# Patient Record
Sex: FEMALE | Race: OTHER | HISPANIC OR LATINO
[De-identification: names, ages, dates, MRNs, and addresses within clinical notes are randomized per-mention and may not be internally consistent; named-entity substitution may affect disease eponyms.]

---

## 2017-03-10 ENCOUNTER — APPOINTMENT (OUTPATIENT)
Dept: MRI IMAGING | Facility: CLINIC | Age: 57
End: 2017-03-10

## 2017-03-10 ENCOUNTER — APPOINTMENT (OUTPATIENT)
Dept: RADIOLOGY | Facility: CLINIC | Age: 57
End: 2017-03-10

## 2017-03-10 ENCOUNTER — OUTPATIENT (OUTPATIENT)
Dept: OUTPATIENT SERVICES | Facility: HOSPITAL | Age: 57
LOS: 1 days | End: 2017-03-10
Payer: COMMERCIAL

## 2017-03-10 DIAGNOSIS — Z00.8 ENCOUNTER FOR OTHER GENERAL EXAMINATION: ICD-10-CM

## 2017-03-10 PROCEDURE — A9585: CPT

## 2017-03-10 PROCEDURE — C8908: CPT

## 2017-03-10 PROCEDURE — 77080 DXA BONE DENSITY AXIAL: CPT

## 2017-03-10 PROCEDURE — 77080 DXA BONE DENSITY AXIAL: CPT | Mod: 26

## 2017-03-10 PROCEDURE — C8937: CPT

## 2017-03-10 PROCEDURE — 77059 MRI BREAST BILATERAL: CPT | Mod: 26

## 2017-03-10 PROCEDURE — 0159T: CPT | Mod: 26

## 2017-05-31 ENCOUNTER — APPOINTMENT (OUTPATIENT)
Dept: SURGERY | Facility: CLINIC | Age: 57
End: 2017-05-31

## 2017-06-28 ENCOUNTER — APPOINTMENT (OUTPATIENT)
Dept: SURGERY | Facility: CLINIC | Age: 57
End: 2017-06-28

## 2017-09-05 ENCOUNTER — OUTPATIENT (OUTPATIENT)
Dept: OUTPATIENT SERVICES | Facility: HOSPITAL | Age: 57
LOS: 1 days | End: 2017-09-05
Payer: COMMERCIAL

## 2017-09-05 ENCOUNTER — APPOINTMENT (OUTPATIENT)
Dept: MAMMOGRAPHY | Facility: CLINIC | Age: 57
End: 2017-09-05

## 2017-09-05 ENCOUNTER — APPOINTMENT (OUTPATIENT)
Dept: ULTRASOUND IMAGING | Facility: CLINIC | Age: 57
End: 2017-09-05

## 2017-09-05 DIAGNOSIS — Z00.8 ENCOUNTER FOR OTHER GENERAL EXAMINATION: ICD-10-CM

## 2017-09-05 PROCEDURE — 77063 BREAST TOMOSYNTHESIS BI: CPT | Mod: 26

## 2017-09-05 PROCEDURE — 76641 ULTRASOUND BREAST COMPLETE: CPT

## 2017-09-05 PROCEDURE — G0202: CPT | Mod: 26

## 2017-09-05 PROCEDURE — 76641 ULTRASOUND BREAST COMPLETE: CPT | Mod: 26,50

## 2017-09-05 PROCEDURE — 77067 SCR MAMMO BI INCL CAD: CPT

## 2017-09-05 PROCEDURE — 77063 BREAST TOMOSYNTHESIS BI: CPT

## 2017-12-13 ENCOUNTER — APPOINTMENT (OUTPATIENT)
Dept: RADIOLOGY | Facility: CLINIC | Age: 57
End: 2017-12-13

## 2017-12-13 ENCOUNTER — APPOINTMENT (OUTPATIENT)
Dept: ULTRASOUND IMAGING | Facility: CLINIC | Age: 57
End: 2017-12-13

## 2017-12-13 ENCOUNTER — OUTPATIENT (OUTPATIENT)
Dept: OUTPATIENT SERVICES | Facility: HOSPITAL | Age: 57
LOS: 1 days | End: 2017-12-13
Payer: COMMERCIAL

## 2017-12-13 DIAGNOSIS — Z00.8 ENCOUNTER FOR OTHER GENERAL EXAMINATION: ICD-10-CM

## 2017-12-13 PROCEDURE — 73110 X-RAY EXAM OF WRIST: CPT | Mod: 26,RT

## 2017-12-13 PROCEDURE — 76536 US EXAM OF HEAD AND NECK: CPT | Mod: 26

## 2017-12-13 PROCEDURE — 73110 X-RAY EXAM OF WRIST: CPT

## 2017-12-13 PROCEDURE — 76536 US EXAM OF HEAD AND NECK: CPT

## 2018-05-17 ENCOUNTER — APPOINTMENT (OUTPATIENT)
Dept: MRI IMAGING | Facility: CLINIC | Age: 58
End: 2018-05-17
Payer: COMMERCIAL

## 2018-05-17 ENCOUNTER — OUTPATIENT (OUTPATIENT)
Dept: OUTPATIENT SERVICES | Facility: HOSPITAL | Age: 58
LOS: 1 days | End: 2018-05-17
Payer: COMMERCIAL

## 2018-05-17 DIAGNOSIS — Z00.8 ENCOUNTER FOR OTHER GENERAL EXAMINATION: ICD-10-CM

## 2018-05-17 PROCEDURE — C8908: CPT

## 2018-05-17 PROCEDURE — 77059 MRI BREAST BILATERAL: CPT | Mod: 26

## 2018-05-17 PROCEDURE — A9585: CPT

## 2018-05-17 PROCEDURE — 0159T: CPT | Mod: 26

## 2018-05-17 PROCEDURE — C8937: CPT

## 2018-06-27 ENCOUNTER — APPOINTMENT (OUTPATIENT)
Dept: SURGERY | Facility: CLINIC | Age: 58
End: 2018-06-27
Payer: COMMERCIAL

## 2018-06-27 PROCEDURE — 99213K: CUSTOM

## 2018-07-19 ENCOUNTER — APPOINTMENT (OUTPATIENT)
Dept: RADIOLOGY | Facility: CLINIC | Age: 58
End: 2018-07-19
Payer: COMMERCIAL

## 2018-07-19 ENCOUNTER — OUTPATIENT (OUTPATIENT)
Dept: OUTPATIENT SERVICES | Facility: HOSPITAL | Age: 58
LOS: 1 days | End: 2018-07-19
Payer: COMMERCIAL

## 2018-07-19 DIAGNOSIS — Z00.8 ENCOUNTER FOR OTHER GENERAL EXAMINATION: ICD-10-CM

## 2018-07-19 PROCEDURE — 73502 X-RAY EXAM HIP UNI 2-3 VIEWS: CPT | Mod: 26,RT

## 2018-07-19 PROCEDURE — 73502 X-RAY EXAM HIP UNI 2-3 VIEWS: CPT

## 2018-07-27 ENCOUNTER — APPOINTMENT (OUTPATIENT)
Dept: MRI IMAGING | Facility: CLINIC | Age: 58
End: 2018-07-27
Payer: COMMERCIAL

## 2018-07-27 ENCOUNTER — OUTPATIENT (OUTPATIENT)
Dept: OUTPATIENT SERVICES | Facility: HOSPITAL | Age: 58
LOS: 1 days | End: 2018-07-27
Payer: COMMERCIAL

## 2018-07-27 DIAGNOSIS — Z00.8 ENCOUNTER FOR OTHER GENERAL EXAMINATION: ICD-10-CM

## 2018-07-27 PROCEDURE — 73721 MRI JNT OF LWR EXTRE W/O DYE: CPT

## 2018-07-27 PROCEDURE — 73721 MRI JNT OF LWR EXTRE W/O DYE: CPT | Mod: 26,RT

## 2019-08-28 ENCOUNTER — APPOINTMENT (OUTPATIENT)
Dept: SURGERY | Facility: CLINIC | Age: 59
End: 2019-08-28

## 2019-09-04 ENCOUNTER — FORM ENCOUNTER (OUTPATIENT)
Age: 59
End: 2019-09-04

## 2019-09-05 ENCOUNTER — APPOINTMENT (OUTPATIENT)
Dept: ULTRASOUND IMAGING | Facility: CLINIC | Age: 59
End: 2019-09-05
Payer: COMMERCIAL

## 2019-09-05 ENCOUNTER — FORM ENCOUNTER (OUTPATIENT)
Age: 59
End: 2019-09-05

## 2019-09-05 ENCOUNTER — APPOINTMENT (OUTPATIENT)
Dept: MAMMOGRAPHY | Facility: CLINIC | Age: 59
End: 2019-09-05
Payer: COMMERCIAL

## 2019-09-05 ENCOUNTER — OUTPATIENT (OUTPATIENT)
Dept: OUTPATIENT SERVICES | Facility: HOSPITAL | Age: 59
LOS: 1 days | End: 2019-09-05
Payer: COMMERCIAL

## 2019-09-05 DIAGNOSIS — Z00.8 ENCOUNTER FOR OTHER GENERAL EXAMINATION: ICD-10-CM

## 2019-09-05 PROCEDURE — 76641 ULTRASOUND BREAST COMPLETE: CPT | Mod: 26,50

## 2019-09-05 PROCEDURE — 77063 BREAST TOMOSYNTHESIS BI: CPT | Mod: 26

## 2019-09-05 PROCEDURE — 76641 ULTRASOUND BREAST COMPLETE: CPT

## 2019-09-05 PROCEDURE — 77063 BREAST TOMOSYNTHESIS BI: CPT

## 2019-09-05 PROCEDURE — 77067 SCR MAMMO BI INCL CAD: CPT

## 2019-09-05 PROCEDURE — 77067 SCR MAMMO BI INCL CAD: CPT | Mod: 26

## 2019-09-13 ENCOUNTER — NON-APPOINTMENT (OUTPATIENT)
Age: 59
End: 2019-09-13

## 2019-09-13 ENCOUNTER — LABORATORY RESULT (OUTPATIENT)
Age: 59
End: 2019-09-13

## 2019-09-13 ENCOUNTER — APPOINTMENT (OUTPATIENT)
Dept: INTERNAL MEDICINE | Facility: CLINIC | Age: 59
End: 2019-09-13
Payer: COMMERCIAL

## 2019-09-13 VITALS
OXYGEN SATURATION: 97 % | HEIGHT: 66 IN | TEMPERATURE: 98.6 F | WEIGHT: 195.44 LBS | BODY MASS INDEX: 31.41 KG/M2 | DIASTOLIC BLOOD PRESSURE: 68 MMHG | HEART RATE: 73 BPM | SYSTOLIC BLOOD PRESSURE: 102 MMHG | RESPIRATION RATE: 16 BRPM

## 2019-09-13 DIAGNOSIS — Z78.9 OTHER SPECIFIED HEALTH STATUS: ICD-10-CM

## 2019-09-13 DIAGNOSIS — J30.2 OTHER SEASONAL ALLERGIC RHINITIS: ICD-10-CM

## 2019-09-13 DIAGNOSIS — Z82.49 FAMILY HISTORY OF ISCHEMIC HEART DISEASE AND OTHER DISEASES OF THE CIRCULATORY SYSTEM: ICD-10-CM

## 2019-09-13 DIAGNOSIS — Z87.42 PERSONAL HISTORY OF OTHER DISEASES OF THE FEMALE GENITAL TRACT: ICD-10-CM

## 2019-09-13 DIAGNOSIS — Z83.3 FAMILY HISTORY OF DIABETES MELLITUS: ICD-10-CM

## 2019-09-13 PROCEDURE — 36415 COLL VENOUS BLD VENIPUNCTURE: CPT

## 2019-09-13 PROCEDURE — 93000 ELECTROCARDIOGRAM COMPLETE: CPT

## 2019-09-13 PROCEDURE — 99396 PREV VISIT EST AGE 40-64: CPT | Mod: 25

## 2019-09-15 PROBLEM — Z82.49 FAMILY HISTORY OF CORONARY ARTERY DISEASE: Status: ACTIVE | Noted: 2019-09-13

## 2019-09-15 PROBLEM — Z83.3 FAMILY HISTORY OF DIABETES MELLITUS: Status: ACTIVE | Noted: 2019-09-13

## 2019-09-15 PROBLEM — Z87.42 HISTORY OF ENDOMETRIOSIS: Status: RESOLVED | Noted: 2019-09-13 | Resolved: 2019-09-15

## 2019-09-15 PROBLEM — Z82.49 FH: CABG (CORONARY ARTERY BYPASS SURGERY): Status: ACTIVE | Noted: 2019-09-13

## 2019-09-15 NOTE — HISTORY OF PRESENT ILLNESS
[FreeTextEntry1] : annual physical exam\par  [de-identified] : Patient is a 59 year old female with a past medical history as below who presents for annual physical exam. Patient is not currently taking any prescription medications, but is taking several vitamins/supplements (Biotin, Calcium, vitamin B12, B complex, Ocuvite, Turmeric, vitamin C, and vitamin D3 82085). Patient notes intermittent difficulty fully exhaling likely secondary to asthma. She states asthmatic symptoms are usually exacerbated during allergy season and notes taking Zyrtec as needed. Patient states she saw her gynecologist, Dr. Wallis last week. Mammogram done last week was normal. She states she will be having a uterine biopsy done. Patient has never had a colonoscopy. She denies any issues with vision, but notes issues with hearing and itchy ears. She notes intermittent hoarseness/losing her voice after speaking for long periods of time. Patient states she tries to maintain a well-balanced diet, but has not been exercising regularly. Patient received the flu vaccine recently. She is unsure if she has received a tetanus shot in the past 10 years. She notes recent PPD testing.

## 2019-09-15 NOTE — PLAN
[FreeTextEntry1] : Pulmonary\par asthma - discussed starting medication/inhaler, refused as patient notes symptoms are well-controlled \par ENT\par hoarseness/losing voice - referred to ENT specialist, Dr. Gill for possible laryngoscopy  \par seasonal allergies - continue Zyrtec p.o. p.r.n. \par Endocrinology\par hyperlipidemia - continue low cholesterol/low fat diet - check FLP \par obesity - advised low carbohydrate diet and increased CV exercise\par vitamin D deficiency - continue vitamin D-3 26575 unit tablets p.o.q.w. - check vitamin D\par Gastroenterology\par screening colonoscopy - patient to follow up with gastroenterologist, Dr. Duncan as she is due \par Infectious Disease\par Tdap (Adacel) Vaccine - discussed, patient to determine if she has received the vaccine and follow up \par \par check EKG, female panel, UA

## 2019-09-15 NOTE — REVIEW OF SYSTEMS
[Negative] : Heme/Lymph [Hearing Loss] : hearing loss [FreeTextEntry4] : itchy ears; intermittent hoarseness/losing voice after speaking

## 2019-09-15 NOTE — PHYSICAL EXAM
[No Acute Distress] : no acute distress [Well Nourished] : well nourished [Well Developed] : well developed [Well-Appearing] : well-appearing [Normal Sclera/Conjunctiva] : normal sclera/conjunctiva [PERRL] : pupils equal round and reactive to light [EOMI] : extraocular movements intact [Normal Outer Ear/Nose] : the outer ears and nose were normal in appearance [Normal Oropharynx] : the oropharynx was normal [No JVD] : no jugular venous distention [No Lymphadenopathy] : no lymphadenopathy [Supple] : supple [Thyroid Normal, No Nodules] : the thyroid was normal and there were no nodules present [No Respiratory Distress] : no respiratory distress  [No Accessory Muscle Use] : no accessory muscle use [Clear to Auscultation] : lungs were clear to auscultation bilaterally [Normal Rate] : normal rate  [Regular Rhythm] : with a regular rhythm [Normal S1, S2] : normal S1 and S2 [No Murmur] : no murmur heard [No Carotid Bruits] : no carotid bruits [No Abdominal Bruit] : a ~M bruit was not heard ~T in the abdomen [No Varicosities] : no varicosities [Pedal Pulses Present] : the pedal pulses are present [No Edema] : there was no peripheral edema [No Palpable Aorta] : no palpable aorta [No Extremity Clubbing/Cyanosis] : no extremity clubbing/cyanosis [Soft] : abdomen soft [Non Tender] : non-tender [Non-distended] : non-distended [No Masses] : no abdominal mass palpated [No HSM] : no HSM [Normal Bowel Sounds] : normal bowel sounds [Normal Posterior Cervical Nodes] : no posterior cervical lymphadenopathy [Normal Anterior Cervical Nodes] : no anterior cervical lymphadenopathy [No CVA Tenderness] : no CVA  tenderness [No Joint Swelling] : no joint swelling [No Spinal Tenderness] : no spinal tenderness [Grossly Normal Strength/Tone] : grossly normal strength/tone [No Rash] : no rash [Coordination Grossly Intact] : coordination grossly intact [No Focal Deficits] : no focal deficits [Deep Tendon Reflexes (DTR)] : deep tendon reflexes were 2+ and symmetric [Normal Affect] : the affect was normal [Normal Gait] : normal gait [Normal Insight/Judgement] : insight and judgment were intact [de-identified] : obese

## 2019-09-15 NOTE — ADDENDUM
[FreeTextEntry1] : I, Torito Stoll, acted solely as scribe for Dr. Juan Pablo Marx DO on this date 09/13/2019  9:40AM .\par \par All medical record entries made by the Scribe were at my, Dr. Juan Pablo Marx DO direction and personally dictated by me on 09/13/2019  9:40AM. I have reviewed the chart and agree that the record accurately reflects my personal performance of the history, physical exam, assessment and plan. I have also personally directed, reviewed and agreed with the chart.\par

## 2019-09-15 NOTE — ASSESSMENT
[FreeTextEntry1] : Patient is a 59 year old female with a past medical history as above who presents for annual physical exam.\par

## 2019-09-17 LAB
25(OH)D3 SERPL-MCNC: 49.6 NG/ML
ALBUMIN SERPL ELPH-MCNC: 4.4 G/DL
ALP BLD-CCNC: 82 U/L
ALT SERPL-CCNC: 24 U/L
ANION GAP SERPL CALC-SCNC: 13 MMOL/L
APPEARANCE: CLEAR
AST SERPL-CCNC: 20 U/L
BASOPHILS # BLD AUTO: 0.04 K/UL
BASOPHILS NFR BLD AUTO: 0.8 %
BILIRUB SERPL-MCNC: 0.7 MG/DL
BILIRUBIN URINE: NEGATIVE
BLOOD URINE: NEGATIVE
BUN SERPL-MCNC: 16 MG/DL
CALCIUM SERPL-MCNC: 9.5 MG/DL
CHLORIDE SERPL-SCNC: 105 MMOL/L
CHOLEST SERPL-MCNC: 186 MG/DL
CHOLEST/HDLC SERPL: 3.4 RATIO
CO2 SERPL-SCNC: 27 MMOL/L
COLOR: YELLOW
CREAT SERPL-MCNC: 0.83 MG/DL
EOSINOPHIL # BLD AUTO: 0.18 K/UL
EOSINOPHIL NFR BLD AUTO: 3.6 %
ESTIMATED AVERAGE GLUCOSE: 120 MG/DL
GLUCOSE QUALITATIVE U: NEGATIVE
GLUCOSE SERPL-MCNC: 105 MG/DL
HBA1C MFR BLD HPLC: 5.8 %
HCT VFR BLD CALC: 47.9 %
HDLC SERPL-MCNC: 54 MG/DL
HGB BLD-MCNC: 14.6 G/DL
IMM GRANULOCYTES NFR BLD AUTO: 0.2 %
KETONES URINE: NEGATIVE
LDLC SERPL CALC-MCNC: 119 MG/DL
LEUKOCYTE ESTERASE URINE: NEGATIVE
LYMPHOCYTES # BLD AUTO: 1.75 K/UL
LYMPHOCYTES NFR BLD AUTO: 34.5 %
MAN DIFF?: NORMAL
MCHC RBC-ENTMCNC: 27.8 PG
MCHC RBC-ENTMCNC: 30.5 GM/DL
MCV RBC AUTO: 91.2 FL
MONOCYTES # BLD AUTO: 0.33 K/UL
MONOCYTES NFR BLD AUTO: 6.5 %
NEUTROPHILS # BLD AUTO: 2.76 K/UL
NEUTROPHILS NFR BLD AUTO: 54.4 %
NITRITE URINE: NEGATIVE
PH URINE: 7.5
PLATELET # BLD AUTO: 272 K/UL
POTASSIUM SERPL-SCNC: 4.3 MMOL/L
PROT SERPL-MCNC: 7.1 G/DL
PROTEIN URINE: NEGATIVE
RBC # BLD: 5.25 M/UL
RBC # FLD: 13.7 %
SODIUM SERPL-SCNC: 145 MMOL/L
SPECIFIC GRAVITY URINE: 1.02
TRIGL SERPL-MCNC: 66 MG/DL
TSH SERPL-ACNC: 1.69 UIU/ML
UROBILINOGEN URINE: NORMAL
WBC # FLD AUTO: 5.07 K/UL

## 2019-09-30 ENCOUNTER — TRANSCRIPTION ENCOUNTER (OUTPATIENT)
Age: 59
End: 2019-09-30

## 2019-10-08 ENCOUNTER — APPOINTMENT (OUTPATIENT)
Dept: OTOLARYNGOLOGY | Facility: CLINIC | Age: 59
End: 2019-10-08
Payer: COMMERCIAL

## 2019-10-08 VITALS
BODY MASS INDEX: 31.02 KG/M2 | DIASTOLIC BLOOD PRESSURE: 74 MMHG | HEIGHT: 66 IN | HEART RATE: 64 BPM | SYSTOLIC BLOOD PRESSURE: 113 MMHG | WEIGHT: 193 LBS

## 2019-10-08 DIAGNOSIS — J32.0 CHRONIC MAXILLARY SINUSITIS: ICD-10-CM

## 2019-10-08 PROCEDURE — 99203 OFFICE O/P NEW LOW 30 MIN: CPT | Mod: 25

## 2019-10-08 PROCEDURE — 31231 NASAL ENDOSCOPY DX: CPT

## 2019-10-08 RX ORDER — CA/D3/MAG OX/ZINC/COP/MANG/BOR 600 MG-800
250 MCG TABLET,CHEWABLE ORAL
Refills: 0 | Status: ACTIVE | COMMUNITY

## 2019-10-08 RX ORDER — RIBOFLAVIN (VITAMIN B2) 100 MG
100 TABLET ORAL
Refills: 0 | Status: ACTIVE | COMMUNITY

## 2019-10-08 RX ORDER — VITS A,C,E/LUTEIN/MINERALS 300MCG-200
TABLET ORAL
Refills: 0 | Status: ACTIVE | COMMUNITY

## 2019-10-08 RX ORDER — VITAMIN B COMPLEX
CAPSULE ORAL
Refills: 0 | Status: ACTIVE | COMMUNITY

## 2019-10-08 RX ORDER — ACETAMINOPHEN 500 MG
TABLET ORAL
Refills: 0 | Status: ACTIVE | COMMUNITY

## 2019-10-08 NOTE — PHYSICAL EXAM
[Nasal Endoscopy Performed] : nasal endoscopy was performed, see procedure section for findings [] : septum deviated to the left [Midline] : trachea located in midline position [Laryngoscopy Performed] : laryngoscopy was performed, see procedure section for findings [Normal] : no rashes

## 2019-10-08 NOTE — CONSULT LETTER
[Dear  ___] : Dear  [unfilled], [Consult Letter:] : I had the pleasure of evaluating your patient, [unfilled]. [Please see my note below.] : Please see my note below. [Consult Closing:] : Thank you very much for allowing me to participate in the care of this patient.  If you have any questions, please do not hesitate to contact me. [FreeTextEntry3] : Sincerely,\par \par Kalpesh Gill MD., FACS\par

## 2019-10-08 NOTE — REVIEW OF SYSTEMS
[Sneezing] : sneezing [Seasonal Allergies] : seasonal allergies [Post Nasal Drip] : post nasal drip [Ear Pain] : ear pain [Ear Itch] : ear itch [Hearing Loss] : hearing loss [Nose Bleeds] : nose bleeds [Nasal Congestion] : nasal congestion [Problem Snoring] : problem snoring [Throat Clearing] : throat clearing [Hoarseness] : hoarseness [Throat Dryness] : throat dryness [Throat Itching] : throat itching [Eyes Itch] : itching of the eyes [Negative] : Heme/Lymph [FreeTextEntry1] : watery eyes, strawberry birthmarks

## 2019-10-08 NOTE — HISTORY OF PRESENT ILLNESS
[de-identified] : c/o hoarse voice.   Does talk a lot - nurse.  Problem for 4-5 mos.  ? after URI.  Problem intermittent.  Problem daily - clears throat.  Same all day.  No pain.  No hemoptysis or sputum.  Occ needs to clear throat.  Occ feels like mucous or lump in back of throat..  No fevers.  Occ eats late.  No coffee.

## 2019-10-08 NOTE — ASSESSMENT
[FreeTextEntry1] : Patient with several mos hx of clearing throat and dysphonia.  No evidence  of sinusitis but does have findings of reflux.  Recommended reflux  diet and omeprazole before breakfast and before dinner.  Also started on mildred med rinses.  Follow up in 2-3 mos

## 2019-10-11 ENCOUNTER — FORM ENCOUNTER (OUTPATIENT)
Age: 59
End: 2019-10-11

## 2019-10-16 ENCOUNTER — FORM ENCOUNTER (OUTPATIENT)
Age: 59
End: 2019-10-16

## 2019-10-17 ENCOUNTER — RESULT REVIEW (OUTPATIENT)
Age: 59
End: 2019-10-17

## 2019-11-14 ENCOUNTER — APPOINTMENT (OUTPATIENT)
Dept: NEUROLOGY | Facility: CLINIC | Age: 59
End: 2019-11-14
Payer: COMMERCIAL

## 2019-11-14 VITALS
SYSTOLIC BLOOD PRESSURE: 119 MMHG | BODY MASS INDEX: 31.02 KG/M2 | HEART RATE: 80 BPM | OXYGEN SATURATION: 97 % | WEIGHT: 193 LBS | HEIGHT: 66 IN | DIASTOLIC BLOOD PRESSURE: 80 MMHG | RESPIRATION RATE: 14 BRPM

## 2019-11-14 PROCEDURE — 99204 OFFICE O/P NEW MOD 45 MIN: CPT

## 2019-11-14 NOTE — PHYSICAL EXAM
[General Appearance - In No Acute Distress] : in no acute distress [General Appearance - Alert] : alert [Impaired Insight] : insight and judgment were intact [Oriented To Time, Place, And Person] : oriented to person, place, and time [Affect] : the affect was normal [Person] : oriented to person [Place] : oriented to place [Concentration Intact] : normal concentrating ability [Time] : oriented to time [Visual Intact] : visual attention was ~T not ~L decreased [Repeating Phrases] : no difficulty repeating a phrase [Naming Objects] : no difficulty naming common objects [Writing A Sentence] : no difficulty writing a sentence [Fluency] : fluency intact [Comprehension] : comprehension intact [Cranial Nerves Optic (II)] : visual acuity intact bilaterally,  visual fields full to confrontation, pupils equal round and reactive to light [Reading] : reading intact [Past History] : adequate knowledge of personal past history [Cranial Nerves Oculomotor (III)] : extraocular motion intact [Cranial Nerves Facial (VII)] : face symmetrical [Cranial Nerves Trigeminal (V)] : facial sensation intact symmetrically [Cranial Nerves Glossopharyngeal (IX)] : tongue and palate midline [Cranial Nerves Vestibulocochlear (VIII)] : hearing was intact bilaterally [Cranial Nerves Accessory (XI - Cranial And Spinal)] : head turning and shoulder shrug symmetric [Motor Tone] : muscle tone was normal in all four extremities [Motor Strength] : muscle strength was normal in all four extremities [Cranial Nerves Hypoglossal (XII)] : there was no tongue deviation with protrusion [No Muscle Atrophy] : normal bulk in all four extremities [Sensation Tactile Decrease] : light touch was intact [Past-pointing] : there was no past-pointing [Balance] : balance was intact [Tremor] : no tremor present [2+] : Brachioradialis left 2+ [1+] : Ankle jerk left 1+ [Plantar Reflex Right Only] : normal on the right [FreeTextEntry1] : she has an irregular head tremor. There is also slight tilting of her head to the right shoulder and extension of the neck. However she is able to overcome this without any difficulty. There is no restriction of neck movement. No pain.she has resting tremulousness and postural and intention tremor bilaterally. spirals are more wavy on the left. handwriting is normal in size.\par No bradykinesia or rigidity. No vocal dystonia or tremor\par  [Plantar Reflex Left Only] : normal on the left [Extraocular Movements] : extraocular movements were intact [Neck Cervical Mass (___cm)] : no neck mass was observed [Hearing Threshold Finger Rub Not [place]] : hearing was normal [Edema] : there was no peripheral edema [Heart Sounds] : normal S1 and S2 [Abdomen Soft] : soft [No Spinal Tenderness] : no spinal tenderness [] : no rash [Abnormal Walk] : normal gait

## 2019-11-14 NOTE — DISCUSSION/SUMMARY
[FreeTextEntry1] : 59-year-old right-handed female who presents with history of tremors. Hand tremors for 30 years: head tremor for 15 years. she also has recent hoarseness of voice. On exam she has an irregular head tremor and slight posturing but no limitation of movement and no neck pain. She also has mixed tremor in her hands mostly  action/ postural\par \par Impression: Tremor most likely essential tremor but cannot rule out the possibility of cervical dystonia.\par \par Plan:\par MRI of the brain\par Clonazepam 0.25-0.5 mg as needed\par other  treatment options including primidone, Botox were discussed with her. \par Propanolol would not  be a good option given history of asthma\par followup in 3 months

## 2019-11-14 NOTE — HISTORY OF PRESENT ILLNESS
[FreeTextEntry1] : This is a 59-year-old right-handed female who presents with chief complaints of tremors\par \par She has noticed tremors in her hands around 30yrs ago when she was in her 20s and head tremor for about 15 years. \par Her tremors are mostly present with activity / when she is using her hands not at rest. Tremors do not interfere with any of her activities she just has to be careful with certain activities such as having soup with a spoon. he denies any neck pain or pulling. No restriction of neck movement. The tremors socially bothersome but not interfering with activities.tremors get worse with anxiety. There is no family history of tremor She has only one cup of tea in the morning and denies use of other caffeinated beverages. Currently she is not on any inhalers for asthma\par she gets occasional tension type headaches which are chronic usually resolve with Tylenol as needed. She recently noticed hoarseness of her voice for long but she had an ENT evaluation and was diagnosed as having reflux. She feels that the hoarseness is slightly better with use of omeprazole\par She denies any head trauma, strokes, use of anti-dopaminergic medications\par \par review of systems: A complete review of systems is performed and is negative except as listed in history of present illness\par \par Family history: Mother had throat cancer, no history of tremors in the family\par \par social History: she is an RN, no smoking, very rare alcohol use (once in 3-4 years)\par \par past medical history:Asthma, GERD, LCIS in the breast at was fully removed

## 2019-11-18 ENCOUNTER — FORM ENCOUNTER (OUTPATIENT)
Age: 59
End: 2019-11-18

## 2019-11-19 ENCOUNTER — APPOINTMENT (OUTPATIENT)
Dept: MRI IMAGING | Facility: CLINIC | Age: 59
End: 2019-11-19
Payer: COMMERCIAL

## 2019-11-19 ENCOUNTER — OUTPATIENT (OUTPATIENT)
Dept: OUTPATIENT SERVICES | Facility: HOSPITAL | Age: 59
LOS: 1 days | End: 2019-11-19
Payer: COMMERCIAL

## 2019-11-19 DIAGNOSIS — Z00.8 ENCOUNTER FOR OTHER GENERAL EXAMINATION: ICD-10-CM

## 2019-11-19 PROCEDURE — 70551 MRI BRAIN STEM W/O DYE: CPT

## 2019-11-19 PROCEDURE — 70551 MRI BRAIN STEM W/O DYE: CPT | Mod: 26

## 2019-12-02 ENCOUNTER — APPOINTMENT (OUTPATIENT)
Dept: OTOLARYNGOLOGY | Facility: CLINIC | Age: 59
End: 2019-12-02
Payer: COMMERCIAL

## 2019-12-02 VITALS
HEIGHT: 66 IN | SYSTOLIC BLOOD PRESSURE: 120 MMHG | DIASTOLIC BLOOD PRESSURE: 72 MMHG | HEART RATE: 61 BPM | BODY MASS INDEX: 31.02 KG/M2 | WEIGHT: 193 LBS

## 2019-12-02 DIAGNOSIS — R49.0 DYSPHONIA: ICD-10-CM

## 2019-12-02 DIAGNOSIS — K21.9 GASTRO-ESOPHAGEAL REFLUX DISEASE W/OUT ESOPHAGITIS: ICD-10-CM

## 2019-12-02 PROCEDURE — 99213 OFFICE O/P EST LOW 20 MIN: CPT

## 2019-12-02 NOTE — HISTORY OF PRESENT ILLNESS
[de-identified] : Doing better on reflux . Attempting to watch diet.  Minimal symptoms. Patient has been on meds mostly once a day.

## 2019-12-02 NOTE — ASSESSMENT
[FreeTextEntry1] : Paient here for follow up of reflux.  Patient doing well  Advised to taper meds and also discussed GI eval which she is having.  Follow up in 4-6 mos and as necessary and contiinue reflux diet

## 2019-12-02 NOTE — PHYSICAL EXAM
[Nasal Endoscopy Performed] : nasal endoscopy was performed, see procedure section for findings [] : septum deviated to the left [Midline] : trachea located in midline position [Laryngoscopy Performed] : laryngoscopy was performed, see procedure section for findings [Normal] : no rashes [de-identified] : indirect exam - mild postglottic edema - otherwise normal

## 2019-12-10 ENCOUNTER — RESULT REVIEW (OUTPATIENT)
Age: 59
End: 2019-12-10

## 2020-02-13 ENCOUNTER — APPOINTMENT (OUTPATIENT)
Dept: NEUROLOGY | Facility: CLINIC | Age: 60
End: 2020-02-13

## 2020-04-25 ENCOUNTER — MESSAGE (OUTPATIENT)
Age: 60
End: 2020-04-25

## 2020-05-08 ENCOUNTER — APPOINTMENT (OUTPATIENT)
Dept: DISASTER EMERGENCY | Facility: HOSPITAL | Age: 60
End: 2020-05-08

## 2020-05-08 LAB
SARS-COV-2 IGG SERPL IA-ACNC: 23.6 INDEX
SARS-COV-2 IGG SERPL QL IA: POSITIVE

## 2020-06-19 ENCOUNTER — TRANSCRIPTION ENCOUNTER (OUTPATIENT)
Age: 60
End: 2020-06-19

## 2020-10-28 ENCOUNTER — APPOINTMENT (OUTPATIENT)
Dept: INTERNAL MEDICINE | Facility: CLINIC | Age: 60
End: 2020-10-28
Payer: COMMERCIAL

## 2020-10-28 ENCOUNTER — NON-APPOINTMENT (OUTPATIENT)
Age: 60
End: 2020-10-28

## 2020-10-28 VITALS
TEMPERATURE: 98 F | HEART RATE: 78 BPM | SYSTOLIC BLOOD PRESSURE: 120 MMHG | DIASTOLIC BLOOD PRESSURE: 72 MMHG | OXYGEN SATURATION: 98 % | HEIGHT: 66 IN | RESPIRATION RATE: 16 BRPM | WEIGHT: 198 LBS | BODY MASS INDEX: 31.82 KG/M2

## 2020-10-28 DIAGNOSIS — Z11.4 ENCOUNTER FOR SCREENING FOR HUMAN IMMUNODEFICIENCY VIRUS [HIV]: ICD-10-CM

## 2020-10-28 DIAGNOSIS — Z11.59 ENCOUNTER FOR SCREENING FOR OTHER VIRAL DISEASES: ICD-10-CM

## 2020-10-28 DIAGNOSIS — J45.909 UNSPECIFIED ASTHMA, UNCOMPLICATED: ICD-10-CM

## 2020-10-28 PROCEDURE — 99072 ADDL SUPL MATRL&STAF TM PHE: CPT

## 2020-10-28 PROCEDURE — 93000 ELECTROCARDIOGRAM COMPLETE: CPT | Mod: 59

## 2020-10-28 PROCEDURE — 36415 COLL VENOUS BLD VENIPUNCTURE: CPT

## 2020-10-28 PROCEDURE — G0442 ANNUAL ALCOHOL SCREEN 15 MIN: CPT | Mod: NC

## 2020-10-28 PROCEDURE — 99396 PREV VISIT EST AGE 40-64: CPT | Mod: 25

## 2020-10-28 RX ORDER — COLD-HOT PACK
EACH MISCELLANEOUS
Refills: 0 | Status: ACTIVE | COMMUNITY

## 2020-10-28 NOTE — REVIEW OF SYSTEMS
[Hearing Loss] : hearing loss [Shortness Of Breath] : shortness of breath [Joint Pain] : joint pain [Negative] : Heme/Lymph [Wheezing] : no wheezing [Dyspnea on Exertion] : no dyspnea on exertion [FreeTextEntry9] : right knee pain [de-identified] : increased stress

## 2020-10-28 NOTE — HEALTH RISK ASSESSMENT
[No] : In the past 12 months have you used drugs other than those required for medical reasons? No [0] : 2) Feeling down, depressed, or hopeless: Not at all (0) [HIV Test offered] : HIV Test offered [Hepatitis C test offered] : Hepatitis C test offered [Reports changes in hearing] : Reports changes in hearing [] : No [Audit-CScore] : 0 [[id number]] : 0 [Reports changes in vision] : Reports no changes in vision [MammogramComments] : Rx given for mammogram and breast US. [ColonoscopyDate] : 12/19 [ColonoscopyComments] : Revealed small colon polyp and internal hemorrhoids.

## 2020-10-28 NOTE — ADDENDUM
[FreeTextEntry1] : I, Torito Stoll, acted solely as scribe for Dr. Juan Pablo Marx DO on this date 10/28/2020 12:00PM .\par \par All medical record entries made by the Scribe were at my, Dr. Juan Pablo Marx DO direction and personally dictated by me on 10/28/2020 12:00PM. I have reviewed the chart and agree that the record accurately reflects my personal performance of the history, physical exam, assessment and plan. I have also personally directed, reviewed and agreed with the chart.\par

## 2020-10-28 NOTE — ASSESSMENT
[FreeTextEntry1] : Patient is a 60 year old female with a past medical history as above who presents for an annual wellness visit.

## 2020-10-28 NOTE — HISTORY OF PRESENT ILLNESS
[FreeTextEntry1] : annual wellness visit  [de-identified] : Patient is a 60 year old female with a past medical history as below who presents for an annual wellness visit. Patient states she is taking all medications as prescribed and denies any adverse reactions or side effects. She denies any recent asthma exacerbations. GERD is well-managed on Omeprazole. Patient has not seen GYN, Dr. Wallis in the past year. She is due for annual breast imaging. Patient's last screening colonoscopy was in December 2019 with gastroenterologist, Dr. Duncan. Patient notes right knee pain. Past X-Ray per orthopedist, Dr. Booker revealed arthritis. She has been taking Meloxicam as needed. Patent has intermittently noted SOB likely secondary to increased stress/anxiety due to work and her 's health issues. She denies FUNG or wheezing. Patient denies vision problems. She notes issues with hearing. Patient states she received the flu vaccine at work. She inquires about the Shingles Vaccine (Shingrix).

## 2020-10-28 NOTE — PHYSICAL EXAM
[No Acute Distress] : no acute distress [Well Nourished] : well nourished [Well Developed] : well developed [Well-Appearing] : well-appearing [Normal Sclera/Conjunctiva] : normal sclera/conjunctiva [PERRL] : pupils equal round and reactive to light [EOMI] : extraocular movements intact [Normal Outer Ear/Nose] : the outer ears and nose were normal in appearance [Normal Oropharynx] : the oropharynx was normal [No JVD] : no jugular venous distention [No Lymphadenopathy] : no lymphadenopathy [Supple] : supple [Thyroid Normal, No Nodules] : the thyroid was normal and there were no nodules present [No Respiratory Distress] : no respiratory distress  [No Accessory Muscle Use] : no accessory muscle use [Clear to Auscultation] : lungs were clear to auscultation bilaterally [Normal Rate] : normal rate  [Regular Rhythm] : with a regular rhythm [Normal S1, S2] : normal S1 and S2 [No Murmur] : no murmur heard [No Carotid Bruits] : no carotid bruits [No Abdominal Bruit] : a ~M bruit was not heard ~T in the abdomen [No Varicosities] : no varicosities [Pedal Pulses Present] : the pedal pulses are present [No Edema] : there was no peripheral edema [No Palpable Aorta] : no palpable aorta [No Extremity Clubbing/Cyanosis] : no extremity clubbing/cyanosis [Soft] : abdomen soft [Non Tender] : non-tender [Non-distended] : non-distended [No Masses] : no abdominal mass palpated [No HSM] : no HSM [Normal Bowel Sounds] : normal bowel sounds [Normal Posterior Cervical Nodes] : no posterior cervical lymphadenopathy [Normal Anterior Cervical Nodes] : no anterior cervical lymphadenopathy [No CVA Tenderness] : no CVA  tenderness [No Spinal Tenderness] : no spinal tenderness [No Joint Swelling] : no joint swelling [Grossly Normal Strength/Tone] : grossly normal strength/tone [No Rash] : no rash [Coordination Grossly Intact] : coordination grossly intact [No Focal Deficits] : no focal deficits [Normal Gait] : normal gait [Normal Affect] : the affect was normal [Normal Insight/Judgement] : insight and judgment were intact [de-identified] : obese

## 2020-10-28 NOTE — PLAN
[FreeTextEntry1] : ENT\par hearing loss - likely presbycusis - advised to follow up if hearing loss becomes increasingly bothersome for referral to ENT specialist\par Cardiology\par history of hyperlipidemia - continue low cholesterol/low fat diet - check FLP \par Endocrinology\par overweight - continue low carbohydrate diet and CV exercise  \par vitamin D deficiency - continue Vitamin D-3 p.o.q.d. with meals as directed - check vitamin D \par Gynecology\par Follow up with GYN, Dr. Wallis for annual visit - Rx given for mammogram and breast US\par Musculoskeletal\par right knee pain - continue Meloxicam p.o. p.r.n. with food as directed \par Psychiatry\par anxiety - continue Clonazepam 0.5mg p.o. p.r.n. as directed \par Immunization\par flu vaccine - evidence of vaccine administration to be requested\par Shingrix - discussed, patient to determine if vaccine is covered under medical benefits of current insurance plan and follow up for 1st dose if interested; otherwise, instructed to follow up at the pharmacy for vaccine administration\par Infectious Disease\par check HIV AG/AB screen by [facility] and Hepatitis C Ab\par \par check EKG (results as above), female panel, HGB A1C, HIV AG/AB screen by [facility], Hepatitis C Ab, and UA

## 2020-11-05 LAB
25(OH)D3 SERPL-MCNC: 41.8 NG/ML
ALBUMIN SERPL ELPH-MCNC: 4.6 G/DL
ALP BLD-CCNC: 106 U/L
ALT SERPL-CCNC: 28 U/L
ANION GAP SERPL CALC-SCNC: 15 MMOL/L
APPEARANCE: CLEAR
AST SERPL-CCNC: 22 U/L
BASOPHILS # BLD AUTO: 0.05 K/UL
BASOPHILS NFR BLD AUTO: 0.7 %
BILIRUB SERPL-MCNC: 1.1 MG/DL
BILIRUBIN URINE: NEGATIVE
BLOOD URINE: NEGATIVE
BUN SERPL-MCNC: 17 MG/DL
CALCIUM SERPL-MCNC: 10.1 MG/DL
CHLORIDE SERPL-SCNC: 102 MMOL/L
CHOLEST SERPL-MCNC: 214 MG/DL
CO2 SERPL-SCNC: 27 MMOL/L
COLOR: NORMAL
CREAT SERPL-MCNC: 0.86 MG/DL
EOSINOPHIL # BLD AUTO: 0.16 K/UL
EOSINOPHIL NFR BLD AUTO: 2.2 %
ESTIMATED AVERAGE GLUCOSE: 123 MG/DL
GLUCOSE QUALITATIVE U: NEGATIVE
GLUCOSE SERPL-MCNC: 109 MG/DL
HBA1C MFR BLD HPLC: 5.9 %
HCT VFR BLD CALC: 48.6 %
HCV AB SER QL: NONREACTIVE
HCV S/CO RATIO: 0.17 S/CO
HDLC SERPL-MCNC: 55 MG/DL
HGB BLD-MCNC: 14.9 G/DL
HIV1+2 AB SPEC QL IA.RAPID: NONREACTIVE
IMM GRANULOCYTES NFR BLD AUTO: 0.1 %
KETONES URINE: NEGATIVE
LDLC SERPL CALC-MCNC: 138 MG/DL
LEUKOCYTE ESTERASE URINE: NEGATIVE
LYMPHOCYTES # BLD AUTO: 1.98 K/UL
LYMPHOCYTES NFR BLD AUTO: 27.6 %
MAN DIFF?: NORMAL
MCHC RBC-ENTMCNC: 27.7 PG
MCHC RBC-ENTMCNC: 30.7 GM/DL
MCV RBC AUTO: 90.3 FL
MONOCYTES # BLD AUTO: 0.49 K/UL
MONOCYTES NFR BLD AUTO: 6.8 %
NEUTROPHILS # BLD AUTO: 4.49 K/UL
NEUTROPHILS NFR BLD AUTO: 62.6 %
NITRITE URINE: NEGATIVE
NONHDLC SERPL-MCNC: 158 MG/DL
PH URINE: 6.5
PLATELET # BLD AUTO: 294 K/UL
POTASSIUM SERPL-SCNC: 4.9 MMOL/L
PROT SERPL-MCNC: 7.6 G/DL
PROTEIN URINE: NEGATIVE
RBC # BLD: 5.38 M/UL
RBC # FLD: 13.8 %
SODIUM SERPL-SCNC: 144 MMOL/L
SPECIFIC GRAVITY URINE: 1.02
TRIGL SERPL-MCNC: 101 MG/DL
TSH SERPL-ACNC: 1.65 UIU/ML
UROBILINOGEN URINE: NORMAL
WBC # FLD AUTO: 7.18 K/UL

## 2021-01-18 ENCOUNTER — APPOINTMENT (OUTPATIENT)
Dept: MAMMOGRAPHY | Facility: CLINIC | Age: 61
End: 2021-01-18
Payer: COMMERCIAL

## 2021-01-18 ENCOUNTER — RESULT REVIEW (OUTPATIENT)
Age: 61
End: 2021-01-18

## 2021-01-18 ENCOUNTER — OUTPATIENT (OUTPATIENT)
Dept: OUTPATIENT SERVICES | Facility: HOSPITAL | Age: 61
LOS: 1 days | End: 2021-01-18
Payer: COMMERCIAL

## 2021-01-18 ENCOUNTER — APPOINTMENT (OUTPATIENT)
Dept: ULTRASOUND IMAGING | Facility: CLINIC | Age: 61
End: 2021-01-18

## 2021-01-18 DIAGNOSIS — Z12.39 ENCOUNTER FOR OTHER SCREENING FOR MALIGNANT NEOPLASM OF BREAST: ICD-10-CM

## 2021-01-18 DIAGNOSIS — Z00.8 ENCOUNTER FOR OTHER GENERAL EXAMINATION: ICD-10-CM

## 2021-01-18 PROCEDURE — 77067 SCR MAMMO BI INCL CAD: CPT

## 2021-01-18 PROCEDURE — 76641 ULTRASOUND BREAST COMPLETE: CPT | Mod: 26,50

## 2021-01-18 PROCEDURE — 77063 BREAST TOMOSYNTHESIS BI: CPT

## 2021-01-18 PROCEDURE — 77063 BREAST TOMOSYNTHESIS BI: CPT | Mod: 26

## 2021-01-18 PROCEDURE — 76641 ULTRASOUND BREAST COMPLETE: CPT

## 2021-01-18 PROCEDURE — 77067 SCR MAMMO BI INCL CAD: CPT | Mod: 26

## 2021-01-20 ENCOUNTER — APPOINTMENT (OUTPATIENT)
Dept: RADIATION ONCOLOGY | Facility: CLINIC | Age: 61
End: 2021-01-20

## 2021-02-01 ENCOUNTER — APPOINTMENT (OUTPATIENT)
Dept: ULTRASOUND IMAGING | Facility: CLINIC | Age: 61
End: 2021-02-01

## 2021-02-01 ENCOUNTER — APPOINTMENT (OUTPATIENT)
Dept: MAMMOGRAPHY | Facility: CLINIC | Age: 61
End: 2021-02-01

## 2021-02-23 ENCOUNTER — APPOINTMENT (OUTPATIENT)
Dept: MAMMOGRAPHY | Facility: CLINIC | Age: 61
End: 2021-02-23
Payer: COMMERCIAL

## 2021-02-23 ENCOUNTER — RESULT REVIEW (OUTPATIENT)
Age: 61
End: 2021-02-23

## 2021-02-23 ENCOUNTER — OUTPATIENT (OUTPATIENT)
Dept: OUTPATIENT SERVICES | Facility: HOSPITAL | Age: 61
LOS: 1 days | End: 2021-02-23
Payer: COMMERCIAL

## 2021-02-23 ENCOUNTER — APPOINTMENT (OUTPATIENT)
Dept: ULTRASOUND IMAGING | Facility: CLINIC | Age: 61
End: 2021-02-23
Payer: COMMERCIAL

## 2021-02-23 DIAGNOSIS — Z00.8 ENCOUNTER FOR OTHER GENERAL EXAMINATION: ICD-10-CM

## 2021-02-23 PROCEDURE — G0279: CPT | Mod: 26

## 2021-02-23 PROCEDURE — 77066 DX MAMMO INCL CAD BI: CPT | Mod: 26

## 2021-02-23 PROCEDURE — G0279: CPT

## 2021-02-23 PROCEDURE — 76642 ULTRASOUND BREAST LIMITED: CPT | Mod: 26,50

## 2021-02-23 PROCEDURE — 76642 ULTRASOUND BREAST LIMITED: CPT

## 2021-02-23 PROCEDURE — 77066 DX MAMMO INCL CAD BI: CPT

## 2021-03-04 ENCOUNTER — OUTPATIENT (OUTPATIENT)
Dept: OUTPATIENT SERVICES | Facility: HOSPITAL | Age: 61
LOS: 1 days | End: 2021-03-04
Payer: COMMERCIAL

## 2021-03-04 ENCOUNTER — RESULT REVIEW (OUTPATIENT)
Age: 61
End: 2021-03-04

## 2021-03-04 ENCOUNTER — APPOINTMENT (OUTPATIENT)
Dept: ULTRASOUND IMAGING | Facility: CLINIC | Age: 61
End: 2021-03-04
Payer: COMMERCIAL

## 2021-03-04 DIAGNOSIS — Z00.8 ENCOUNTER FOR OTHER GENERAL EXAMINATION: ICD-10-CM

## 2021-03-04 PROCEDURE — 77065 DX MAMMO INCL CAD UNI: CPT

## 2021-03-04 PROCEDURE — 88360 TUMOR IMMUNOHISTOCHEM/MANUAL: CPT | Mod: 26

## 2021-03-04 PROCEDURE — 88305 TISSUE EXAM BY PATHOLOGIST: CPT

## 2021-03-04 PROCEDURE — 19083 BX BREAST 1ST LESION US IMAG: CPT

## 2021-03-04 PROCEDURE — 77065 DX MAMMO INCL CAD UNI: CPT | Mod: 26,LT

## 2021-03-04 PROCEDURE — 19083 BX BREAST 1ST LESION US IMAG: CPT | Mod: LT

## 2021-03-04 PROCEDURE — 88360 TUMOR IMMUNOHISTOCHEM/MANUAL: CPT

## 2021-03-04 PROCEDURE — A4648: CPT

## 2021-03-04 PROCEDURE — 88305 TISSUE EXAM BY PATHOLOGIST: CPT | Mod: 26

## 2021-03-10 ENCOUNTER — APPOINTMENT (OUTPATIENT)
Dept: SURGERY | Facility: CLINIC | Age: 61
End: 2021-03-10
Payer: COMMERCIAL

## 2021-03-10 PROCEDURE — 99215K: CUSTOM

## 2021-03-16 ENCOUNTER — APPOINTMENT (OUTPATIENT)
Dept: MRI IMAGING | Facility: CLINIC | Age: 61
End: 2021-03-16
Payer: COMMERCIAL

## 2021-03-16 ENCOUNTER — OUTPATIENT (OUTPATIENT)
Dept: OUTPATIENT SERVICES | Facility: HOSPITAL | Age: 61
LOS: 1 days | End: 2021-03-16
Payer: COMMERCIAL

## 2021-03-16 ENCOUNTER — RESULT REVIEW (OUTPATIENT)
Age: 61
End: 2021-03-16

## 2021-03-16 DIAGNOSIS — Z00.8 ENCOUNTER FOR OTHER GENERAL EXAMINATION: ICD-10-CM

## 2021-03-16 PROCEDURE — C8937: CPT

## 2021-03-16 PROCEDURE — C8908: CPT

## 2021-03-16 PROCEDURE — 77049 MRI BREAST C-+ W/CAD BI: CPT | Mod: 26

## 2021-03-16 PROCEDURE — A9585: CPT

## 2021-03-18 ENCOUNTER — OUTPATIENT (OUTPATIENT)
Dept: OUTPATIENT SERVICES | Facility: HOSPITAL | Age: 61
LOS: 1 days | End: 2021-03-18
Payer: COMMERCIAL

## 2021-03-18 ENCOUNTER — RESULT REVIEW (OUTPATIENT)
Age: 61
End: 2021-03-18

## 2021-03-18 ENCOUNTER — APPOINTMENT (OUTPATIENT)
Dept: MRI IMAGING | Facility: CLINIC | Age: 61
End: 2021-03-18
Payer: COMMERCIAL

## 2021-03-18 DIAGNOSIS — Z00.8 ENCOUNTER FOR OTHER GENERAL EXAMINATION: ICD-10-CM

## 2021-03-18 PROCEDURE — 77065 DX MAMMO INCL CAD UNI: CPT

## 2021-03-18 PROCEDURE — 88305 TISSUE EXAM BY PATHOLOGIST: CPT | Mod: 26

## 2021-03-18 PROCEDURE — 88305 TISSUE EXAM BY PATHOLOGIST: CPT

## 2021-03-18 PROCEDURE — 19085 BX BREAST 1ST LESION MR IMAG: CPT

## 2021-03-18 PROCEDURE — 19085 BX BREAST 1ST LESION MR IMAG: CPT | Mod: RT

## 2021-03-18 PROCEDURE — 77065 DX MAMMO INCL CAD UNI: CPT | Mod: 26,RT

## 2021-03-24 ENCOUNTER — OUTPATIENT (OUTPATIENT)
Dept: OUTPATIENT SERVICES | Facility: HOSPITAL | Age: 61
LOS: 1 days | End: 2021-03-24
Payer: COMMERCIAL

## 2021-03-24 ENCOUNTER — RESULT REVIEW (OUTPATIENT)
Age: 61
End: 2021-03-24

## 2021-03-24 ENCOUNTER — APPOINTMENT (OUTPATIENT)
Dept: MAMMOGRAPHY | Facility: IMAGING CENTER | Age: 61
End: 2021-03-24
Payer: COMMERCIAL

## 2021-03-24 VITALS
RESPIRATION RATE: 20 BRPM | WEIGHT: 186.07 LBS | TEMPERATURE: 97 F | OXYGEN SATURATION: 98 % | HEART RATE: 70 BPM | DIASTOLIC BLOOD PRESSURE: 80 MMHG | HEIGHT: 66 IN | SYSTOLIC BLOOD PRESSURE: 123 MMHG

## 2021-03-24 DIAGNOSIS — C50.912 MALIGNANT NEOPLASM OF UNSPECIFIED SITE OF LEFT FEMALE BREAST: ICD-10-CM

## 2021-03-24 DIAGNOSIS — Z00.8 ENCOUNTER FOR OTHER GENERAL EXAMINATION: ICD-10-CM

## 2021-03-24 DIAGNOSIS — Z01.812 ENCOUNTER FOR PREPROCEDURAL LABORATORY EXAMINATION: ICD-10-CM

## 2021-03-24 DIAGNOSIS — Z91.040 LATEX ALLERGY STATUS: ICD-10-CM

## 2021-03-24 LAB
ALBUMIN SERPL ELPH-MCNC: 4.4 G/DL — SIGNIFICANT CHANGE UP (ref 3.3–5)
ALP SERPL-CCNC: 87 U/L — SIGNIFICANT CHANGE UP (ref 40–120)
ALT FLD-CCNC: 27 U/L — SIGNIFICANT CHANGE UP (ref 4–33)
ANION GAP SERPL CALC-SCNC: 10 MMOL/L — SIGNIFICANT CHANGE UP (ref 7–14)
AST SERPL-CCNC: 22 U/L — SIGNIFICANT CHANGE UP (ref 4–32)
BILIRUB SERPL-MCNC: 0.4 MG/DL — SIGNIFICANT CHANGE UP (ref 0.2–1.2)
BUN SERPL-MCNC: 22 MG/DL — SIGNIFICANT CHANGE UP (ref 7–23)
CALCIUM SERPL-MCNC: 9.3 MG/DL — SIGNIFICANT CHANGE UP (ref 8.4–10.5)
CHLORIDE SERPL-SCNC: 103 MMOL/L — SIGNIFICANT CHANGE UP (ref 98–107)
CO2 SERPL-SCNC: 27 MMOL/L — SIGNIFICANT CHANGE UP (ref 22–31)
CREAT SERPL-MCNC: 0.78 MG/DL — SIGNIFICANT CHANGE UP (ref 0.5–1.3)
GLUCOSE SERPL-MCNC: 119 MG/DL — HIGH (ref 70–99)
HCT VFR BLD CALC: 45 % — SIGNIFICANT CHANGE UP (ref 34.5–45)
HGB BLD-MCNC: 13.9 G/DL — SIGNIFICANT CHANGE UP (ref 11.5–15.5)
MCHC RBC-ENTMCNC: 27.1 PG — SIGNIFICANT CHANGE UP (ref 27–34)
MCHC RBC-ENTMCNC: 30.9 GM/DL — LOW (ref 32–36)
MCV RBC AUTO: 87.9 FL — SIGNIFICANT CHANGE UP (ref 80–100)
NRBC # BLD: 0 /100 WBCS — SIGNIFICANT CHANGE UP
NRBC # FLD: 0 K/UL — SIGNIFICANT CHANGE UP
PLATELET # BLD AUTO: 297 K/UL — SIGNIFICANT CHANGE UP (ref 150–400)
POTASSIUM SERPL-MCNC: 3.9 MMOL/L — SIGNIFICANT CHANGE UP (ref 3.5–5.3)
POTASSIUM SERPL-SCNC: 3.9 MMOL/L — SIGNIFICANT CHANGE UP (ref 3.5–5.3)
PROT SERPL-MCNC: 7.1 G/DL — SIGNIFICANT CHANGE UP (ref 6–8.3)
RBC # BLD: 5.12 M/UL — SIGNIFICANT CHANGE UP (ref 3.8–5.2)
RBC # FLD: 13.2 % — SIGNIFICANT CHANGE UP (ref 10.3–14.5)
SODIUM SERPL-SCNC: 140 MMOL/L — SIGNIFICANT CHANGE UP (ref 135–145)
WBC # BLD: 7.4 K/UL — SIGNIFICANT CHANGE UP (ref 3.8–10.5)
WBC # FLD AUTO: 7.4 K/UL — SIGNIFICANT CHANGE UP (ref 3.8–10.5)

## 2021-03-24 PROCEDURE — 19281 PERQ DEVICE BREAST 1ST IMAG: CPT | Mod: LT

## 2021-03-24 PROCEDURE — 93010 ELECTROCARDIOGRAM REPORT: CPT

## 2021-03-24 PROCEDURE — 19281 PERQ DEVICE BREAST 1ST IMAG: CPT

## 2021-03-24 PROCEDURE — C1739: CPT

## 2021-03-24 PROCEDURE — 19281 PERQ DEVICE BREAST 1ST IMAG: CPT | Mod: RT

## 2021-03-24 NOTE — H&P PST ADULT - POSTERIOR CERVICAL R
Tranexamic Acid Pregnancy And Lactation Text: It is unknown if this medication is safe during pregnancy or breast feeding. normal

## 2021-03-24 NOTE — H&P PST ADULT - BREASTS DETAILS
mass L/mass R band-aids to b/l Breast biopsy site, small areas of resolving ecchymosis noted/mass L/mass R/tenderness L/tenderness R

## 2021-03-24 NOTE — H&P PST ADULT - BREASTS COMMENTS
Preop dx malignant neoplasm of unspecified site of left female Breast, & other abnormal & inconclusive findings on diagnostic imaging of breast

## 2021-03-24 NOTE — H&P PST ADULT - MAMMOGRAM, RESULTS OF LAST, PROFILE
abnormal finding left Breast, MRI with biopsy done, positive malignancy left Breast, abnormal finding in right Breast, Pt states biopsy on Right unable to palpate breast mass on exam was inconclusive

## 2021-03-24 NOTE — H&P PST ADULT - NSICDXFAMILYHX_GEN_ALL_CORE_FT
FAMILY HISTORY:  Family hx of hypertension, mother & brother  FHx: esophageal cancer, mother  FHx: heart disease, mother

## 2021-03-24 NOTE — H&P PST ADULT - HISTORY OF PRESENT ILLNESS
59y/o female presents for preop eval for scheduled right Breast biopsy with seed localization, left partial mastectomy with seed localization sentinel lymph node biopsy.  Pt states recent routine mammogram and sonogram show left Breast lump.  MRI  with Biopsy of left in Feb 2021 positive for malignancy.  and abnormal finding in right Breast. Biopsy of right Breast inconclusive.

## 2021-03-24 NOTE — H&P PST ADULT - NSICDXPROBLEM_GEN_ALL_CORE_FT
PROBLEM DIAGNOSES  Problem: Malignant neoplasm of unspecified site of left female breast  Assessment and Plan: Written & verbal preop instructions, gi prophylaxis & surgical soap given  Pt verbalized good understanding.  Teach back done on surgical soap instructions.      Problem: Encounter for preprocedure screening laboratory testing for COVID-19  Assessment and Plan: per pt scheduled within 72 hrs of this surgery        PROBLEM DIAGNOSES  Problem: Latex allergy  Assessment and Plan: OR booking notified     Problem: Malignant neoplasm of unspecified site of left female breast  Assessment and Plan:  scheduled right Breast biopsy with seed localization, left partial mastectomy with seed localization sentinel lymph node biopsy 3/30/2021  Written & verbal preop instructions, gi prophylaxis & surgical soap given  Pt verbalized good understanding.  Teach back done on surgical soap instructions.      Problem: Encounter for preprocedure screening laboratory testing for COVID-19  Assessment and Plan: per pt scheduled within 72 hrs of this surgery

## 2021-03-24 NOTE — H&P PST ADULT - NSICDXPASTMEDICALHX_GEN_ALL_CORE_FT
PAST MEDICAL HISTORY:  Asthma never intubated or hospitalized for it, on no meds now    Malignant neoplasm of unspecified site of left female breast     Seasonal allergies

## 2021-03-27 ENCOUNTER — APPOINTMENT (OUTPATIENT)
Dept: DISASTER EMERGENCY | Facility: CLINIC | Age: 61
End: 2021-03-27

## 2021-03-28 LAB — SARS-COV-2 N GENE NPH QL NAA+PROBE: NOT DETECTED

## 2021-03-29 ENCOUNTER — FORM ENCOUNTER (OUTPATIENT)
Age: 61
End: 2021-03-29

## 2021-03-29 NOTE — ASU PATIENT PROFILE, ADULT - PMH
Asthma  never intubated or hospitalized for it, on no meds now  Malignant neoplasm of unspecified site of left female breast    Seasonal allergies

## 2021-03-30 ENCOUNTER — APPOINTMENT (OUTPATIENT)
Dept: NUCLEAR MEDICINE | Facility: HOSPITAL | Age: 61
End: 2021-03-30

## 2021-03-30 ENCOUNTER — APPOINTMENT (OUTPATIENT)
Dept: SURGERY | Facility: HOSPITAL | Age: 61
End: 2021-03-30

## 2021-03-30 ENCOUNTER — RESULT REVIEW (OUTPATIENT)
Age: 61
End: 2021-03-30

## 2021-03-30 ENCOUNTER — OUTPATIENT (OUTPATIENT)
Dept: OUTPATIENT SERVICES | Facility: HOSPITAL | Age: 61
LOS: 1 days | Discharge: HOME | End: 2021-03-30
Payer: COMMERCIAL

## 2021-03-30 VITALS
DIASTOLIC BLOOD PRESSURE: 68 MMHG | HEIGHT: 66 IN | OXYGEN SATURATION: 97 % | HEART RATE: 70 BPM | RESPIRATION RATE: 17 BRPM | WEIGHT: 186.07 LBS | TEMPERATURE: 99 F | SYSTOLIC BLOOD PRESSURE: 117 MMHG

## 2021-03-30 VITALS
RESPIRATION RATE: 16 BRPM | DIASTOLIC BLOOD PRESSURE: 67 MMHG | SYSTOLIC BLOOD PRESSURE: 111 MMHG | HEART RATE: 53 BPM | OXYGEN SATURATION: 100 %

## 2021-03-30 DIAGNOSIS — C50.912 MALIGNANT NEOPLASM OF UNSPECIFIED SITE OF LEFT FEMALE BREAST: ICD-10-CM

## 2021-03-30 PROCEDURE — 88342 IMHCHEM/IMCYTCHM 1ST ANTB: CPT | Mod: 26,59

## 2021-03-30 PROCEDURE — 19125K: CUSTOM | Mod: RT,59

## 2021-03-30 PROCEDURE — 88305 TISSUE EXAM BY PATHOLOGIST: CPT | Mod: 26

## 2021-03-30 PROCEDURE — 88307 TISSUE EXAM BY PATHOLOGIST: CPT | Mod: 26

## 2021-03-30 PROCEDURE — 38525K: CUSTOM | Mod: LT

## 2021-03-30 PROCEDURE — 88360 TUMOR IMMUNOHISTOCHEM/MANUAL: CPT | Mod: 26

## 2021-03-30 PROCEDURE — 19301K: CUSTOM | Mod: LT

## 2021-03-30 PROCEDURE — 88341 IMHCHEM/IMCYTCHM EA ADD ANTB: CPT | Mod: 26,59

## 2021-03-30 PROCEDURE — 38792K: CUSTOM | Mod: LT

## 2021-03-30 PROCEDURE — 76098 X-RAY EXAM SURGICAL SPECIMEN: CPT | Mod: 26

## 2021-03-30 RX ORDER — ONDANSETRON 8 MG/1
4 TABLET, FILM COATED ORAL ONCE
Refills: 0 | Status: DISCONTINUED | OUTPATIENT
Start: 2021-03-30 | End: 2021-04-13

## 2021-03-30 RX ORDER — SODIUM CHLORIDE 9 MG/ML
1000 INJECTION, SOLUTION INTRAVENOUS
Refills: 0 | Status: DISCONTINUED | OUTPATIENT
Start: 2021-03-30 | End: 2021-04-13

## 2021-03-30 RX ORDER — FENTANYL CITRATE 50 UG/ML
25 INJECTION INTRAVENOUS
Refills: 0 | Status: DISCONTINUED | OUTPATIENT
Start: 2021-03-30 | End: 2021-03-30

## 2021-03-30 RX ORDER — MELOXICAM 15 MG/1
1 TABLET ORAL
Qty: 0 | Refills: 0 | DISCHARGE

## 2021-03-30 RX ADMIN — FENTANYL CITRATE 25 MICROGRAM(S): 50 INJECTION INTRAVENOUS at 10:45

## 2021-03-30 RX ADMIN — FENTANYL CITRATE 25 MICROGRAM(S): 50 INJECTION INTRAVENOUS at 11:25

## 2021-03-30 RX ADMIN — SODIUM CHLORIDE 75 MILLILITER(S): 9 INJECTION, SOLUTION INTRAVENOUS at 10:45

## 2021-03-30 NOTE — ASU DISCHARGE PLAN (ADULT/PEDIATRIC) - FOLLOW UP APPOINTMENTS
[facility] After 8 pm PACU /[facility]

## 2021-03-30 NOTE — ASU PREOP CHECKLIST - SKIN PREP
Care Management Interventions  PCP Verified by CM: Yes  Mode of Transport at Discharge: Other (see comment)  Transition of Care Consult (CM Consult): Discharge Planning  Discharge Durable Medical Equipment: No  Physical Therapy Consult: No  Occupational Therapy Consult: No  Speech Therapy Consult: No  Current Support Network: Lives with Spouse, Own Home  Confirm Follow Up Transport: Family  Discharge Location  Discharge Placement: Home with family assistance      Pt admitted to 3rd floor tele for afib. CM met with pt to discuss CM needs & DCP. Pt is A&Ox4. Pt is indep at home with all ADLS. Pt lives with spouse. Pt has no DME needs. Pt has no difficulty with obtaining medications or transport. Pt aware Tikosyn is tier 4 in drug plan, advised that Good Rx price may be more affordable. CM to confirm med availability at pharmacy at time of dc. DCP home with spouse. CM to continue to monitor. done

## 2021-04-02 LAB — SURGICAL PATHOLOGY STUDY: SIGNIFICANT CHANGE UP

## 2021-04-05 ENCOUNTER — APPOINTMENT (OUTPATIENT)
Dept: SURGERY | Facility: CLINIC | Age: 61
End: 2021-04-05
Payer: COMMERCIAL

## 2021-04-05 PROCEDURE — 99024 POSTOP FOLLOW-UP VISIT: CPT

## 2021-04-09 ENCOUNTER — APPOINTMENT (OUTPATIENT)
Dept: RADIATION ONCOLOGY | Facility: CLINIC | Age: 61
End: 2021-04-09
Payer: COMMERCIAL

## 2021-04-09 VITALS
BODY MASS INDEX: 30.53 KG/M2 | HEART RATE: 70 BPM | WEIGHT: 190 LBS | RESPIRATION RATE: 16 BRPM | HEIGHT: 66 IN | SYSTOLIC BLOOD PRESSURE: 118 MMHG | DIASTOLIC BLOOD PRESSURE: 75 MMHG

## 2021-04-09 DIAGNOSIS — Z80.0 FAMILY HISTORY OF MALIGNANT NEOPLASM OF DIGESTIVE ORGANS: ICD-10-CM

## 2021-04-09 PROCEDURE — 99205 OFFICE O/P NEW HI 60 MIN: CPT | Mod: 25

## 2021-04-09 PROCEDURE — 99072 ADDL SUPL MATRL&STAF TM PHE: CPT

## 2021-04-09 RX ORDER — CLONAZEPAM 0.5 MG/1
0.5 TABLET ORAL DAILY
Qty: 15 | Refills: 0 | Status: DISCONTINUED | COMMUNITY
Start: 2019-11-14 | End: 2021-04-09

## 2021-04-09 NOTE — PHYSICAL EXAM
[Symmetric] : breasts are symmetric [Breast Abnormal Lactation (Galactorrhea)] : no nipple discharge [No UE Edema] : there is no upper extremity edema [Normal] : oriented to person, place and time, the affect was normal, the mood was normal and not anxious [de-identified] : bilateral well healing inf circumareolar scars with yellow ecchymosis healing well

## 2021-04-09 NOTE — HISTORY OF PRESENT ILLNESS
[FreeTextEntry1] : The patient is a pleasant 60 year old female diagnosed with left breast invasive ductal carcinoma and right breast DCIS.\par She has a history of a right excisional biopsy in 2013 for LCIS with Dr. Matias. She was having regular breast imaging and followed up with Dr. Matias regularly but she missed 2020 mammo due to COVID-19 pandemic. Her recent screening mammo/sono on 1/24/21 showed a left breast mass and right breast asymmetry. Nodule at 6:00 in left breast for which targeted diagnostic ultrasound was recommended. Diagnostic mammo/sono done on 2/23/21 revealed changes suspicious for malignancy in the left breast at 6:00, 8 cm from the nipple. Ultrasound-guided biopsy on 3/4/21 showed left breast at 6:00 an Invasive well  differentiated ductal carcinoma, Page score 5/9 (2 + 2 + 1) measuring at least 0.6 cm, rare isolated microcalcifications present in the tumor, atypical duct epithelial hyperplasia in one duct ER/[place] 90% positive, HER-2 Negative. Breast MRI also revealed a right breast abnormality and an MRI guided core biopsy on 3/18/21 showed a complex atypical papillary lesion in right, inferior central to medial breast. On 3/30 21 she underwent bilateral partial mastectomies and surgical pathology revealed RIGHT breast ductal carcinoma in Situ in 1/12 blocks, measuring 5 mm, cribriform and micropapillary type, low nuclear grade, margins negative with focal margin <1 mm. Lymph nodes not examined. LEFT breast pathology showed a moderately differentiated invasive ductal carcinoma SBR 6/9 measuring 1.1 cm, unifocal with DCIS present, solid, cribriform and flat type, grade intermediate. LCIS identified. Margins for invasive tumor negative and  > 5mm. No LVI and margins for DCIS negative, 1.5mm. 0/3 SLN involved. ER, [place] 90% positive, HER2 Negative. Received COVID vaccine. Oncotype DX and Genetic panel testing pending. She presents today to discuss the role of radiation therapy in her care. \par \par \par \par \par \par \par

## 2021-04-24 ENCOUNTER — OUTPATIENT (OUTPATIENT)
Dept: OUTPATIENT SERVICES | Facility: HOSPITAL | Age: 61
LOS: 1 days | Discharge: HOME | End: 2021-04-24

## 2021-04-24 DIAGNOSIS — C50.919 MALIGNANT NEOPLASM OF UNSPECIFIED SITE OF UNSPECIFIED FEMALE BREAST: ICD-10-CM

## 2021-04-27 ENCOUNTER — APPOINTMENT (OUTPATIENT)
Dept: HEMATOLOGY ONCOLOGY | Facility: CLINIC | Age: 61
End: 2021-04-27
Payer: COMMERCIAL

## 2021-04-27 ENCOUNTER — RESULT REVIEW (OUTPATIENT)
Age: 61
End: 2021-04-27

## 2021-04-27 VITALS
BODY MASS INDEX: 30.82 KG/M2 | HEIGHT: 65.98 IN | DIASTOLIC BLOOD PRESSURE: 84 MMHG | TEMPERATURE: 98 F | OXYGEN SATURATION: 95 % | SYSTOLIC BLOOD PRESSURE: 133 MMHG | WEIGHT: 191.8 LBS | HEART RATE: 59 BPM | RESPIRATION RATE: 95 BRPM

## 2021-04-27 LAB
BASOPHILS # BLD AUTO: 0.03 K/UL — SIGNIFICANT CHANGE UP (ref 0–0.2)
BASOPHILS NFR BLD AUTO: 0.6 % — SIGNIFICANT CHANGE UP (ref 0–2)
EOSINOPHIL # BLD AUTO: 0.2 K/UL — SIGNIFICANT CHANGE UP (ref 0–0.5)
EOSINOPHIL NFR BLD AUTO: 3.9 % — SIGNIFICANT CHANGE UP (ref 0–6)
HCT VFR BLD CALC: 45.5 % — HIGH (ref 34.5–45)
HGB BLD-MCNC: 14.4 G/DL — SIGNIFICANT CHANGE UP (ref 11.5–15.5)
IMM GRANULOCYTES NFR BLD AUTO: 0.4 % — SIGNIFICANT CHANGE UP (ref 0–1.5)
LYMPHOCYTES # BLD AUTO: 1.72 K/UL — SIGNIFICANT CHANGE UP (ref 1–3.3)
LYMPHOCYTES # BLD AUTO: 33.9 % — SIGNIFICANT CHANGE UP (ref 13–44)
MCHC RBC-ENTMCNC: 28.1 PG — SIGNIFICANT CHANGE UP (ref 27–34)
MCHC RBC-ENTMCNC: 31.6 G/DL — LOW (ref 32–36)
MCV RBC AUTO: 88.7 FL — SIGNIFICANT CHANGE UP (ref 80–100)
MONOCYTES # BLD AUTO: 0.39 K/UL — SIGNIFICANT CHANGE UP (ref 0–0.9)
MONOCYTES NFR BLD AUTO: 7.7 % — SIGNIFICANT CHANGE UP (ref 2–14)
NEUTROPHILS # BLD AUTO: 2.71 K/UL — SIGNIFICANT CHANGE UP (ref 1.8–7.4)
NEUTROPHILS NFR BLD AUTO: 53.5 % — SIGNIFICANT CHANGE UP (ref 43–77)
NRBC # BLD: 0 /100 WBCS — SIGNIFICANT CHANGE UP (ref 0–0)
PLATELET # BLD AUTO: 272 K/UL — SIGNIFICANT CHANGE UP (ref 150–400)
RBC # BLD: 5.13 M/UL — SIGNIFICANT CHANGE UP (ref 3.8–5.2)
RBC # FLD: 13.2 % — SIGNIFICANT CHANGE UP (ref 10.3–14.5)
WBC # BLD: 5.07 K/UL — SIGNIFICANT CHANGE UP (ref 3.8–10.5)
WBC # FLD AUTO: 5.07 K/UL — SIGNIFICANT CHANGE UP (ref 3.8–10.5)

## 2021-04-27 PROCEDURE — 99072 ADDL SUPL MATRL&STAF TM PHE: CPT

## 2021-04-27 PROCEDURE — 99205 OFFICE O/P NEW HI 60 MIN: CPT

## 2021-04-28 LAB
25(OH)D3 SERPL-MCNC: 44.7 NG/ML
ALBUMIN SERPL ELPH-MCNC: 4.5 G/DL
ALP BLD-CCNC: 95 U/L
ALT SERPL-CCNC: 21 U/L
ANION GAP SERPL CALC-SCNC: 17 MMOL/L
AST SERPL-CCNC: 21 U/L
BILIRUB SERPL-MCNC: 0.5 MG/DL
BUN SERPL-MCNC: 17 MG/DL
CALCIUM SERPL-MCNC: 10 MG/DL
CHLORIDE SERPL-SCNC: 104 MMOL/L
CO2 SERPL-SCNC: 22 MMOL/L
CREAT SERPL-MCNC: 0.86 MG/DL
ESTRADIOL SERPL-MCNC: 10 PG/ML
FSH SERPL-MCNC: 43.5 IU/L
GLUCOSE SERPL-MCNC: 101 MG/DL
LH SERPL-ACNC: 27.1 IU/L
POTASSIUM SERPL-SCNC: 4.5 MMOL/L
PROT SERPL-MCNC: 7.3 G/DL
SODIUM SERPL-SCNC: 143 MMOL/L

## 2021-05-04 PROCEDURE — 77263 THER RADIOLOGY TX PLNG CPLX: CPT

## 2021-05-04 PROCEDURE — 99072 ADDL SUPL MATRL&STAF TM PHE: CPT

## 2021-05-04 PROCEDURE — 77290 THER RAD SIMULAJ FIELD CPLX: CPT

## 2021-05-04 PROCEDURE — 77332 RADIATION TREATMENT AID(S): CPT

## 2021-05-07 PROCEDURE — 77295 3-D RADIOTHERAPY PLAN: CPT

## 2021-05-07 PROCEDURE — 77334 RADIATION TREATMENT AID(S): CPT

## 2021-05-07 PROCEDURE — 77300 RADIATION THERAPY DOSE PLAN: CPT

## 2021-05-13 PROCEDURE — 99072 ADDL SUPL MATRL&STAF TM PHE: CPT

## 2021-05-13 PROCEDURE — 77280 THER RAD SIMULAJ FIELD SMPL: CPT

## 2021-05-14 PROCEDURE — 99072 ADDL SUPL MATRL&STAF TM PHE: CPT

## 2021-05-14 PROCEDURE — 77263 THER RADIOLOGY TX PLNG CPLX: CPT

## 2021-05-17 PROCEDURE — G6017: CPT

## 2021-05-17 PROCEDURE — 77427 RADIATION TX MANAGEMENT X5: CPT | Mod: 59

## 2021-05-17 PROCEDURE — G6012: CPT

## 2021-05-17 PROCEDURE — 99072 ADDL SUPL MATRL&STAF TM PHE: CPT

## 2021-05-18 ENCOUNTER — NON-APPOINTMENT (OUTPATIENT)
Age: 61
End: 2021-05-18

## 2021-05-18 VITALS — HEART RATE: 64 BPM | WEIGHT: 191 LBS | BODY MASS INDEX: 31.82 KG/M2 | RESPIRATION RATE: 16 BRPM | HEIGHT: 65 IN

## 2021-05-18 PROCEDURE — G6017: CPT

## 2021-05-18 PROCEDURE — 99072 ADDL SUPL MATRL&STAF TM PHE: CPT

## 2021-05-18 PROCEDURE — G6012: CPT

## 2021-05-18 NOTE — DISEASE MANAGEMENT
[Pathological] : TNM Stage: p [IA] : IA [NTNM] : 0 [TTNM] : 1c [MTNM] : 0 [de-identified] : [id number] [[place] [id number]] : [id number] [de-identified] : left breast

## 2021-05-18 NOTE — HISTORY OF PRESENT ILLNESS
[FreeTextEntry1] : The patient is a pleasant 60 year old female diagnosed with left breast invasive ductal carcinoma and right breast DCIS.\par She has a history of a right excisional biopsy in 2013 for LCIS with Dr. Matias. She was having regular breast imaging and followed up with Dr. Matias regularly but she missed 2020 mammo due to COVID-19 pandemic. Her recent screening mammo/sono on 1/24/21 showed a left breast mass and right breast asymmetry. Nodule at 6:00 in left breast for which targeted diagnostic ultrasound was recommended. Diagnostic mammo/sono done on 2/23/21 revealed changes suspicious for malignancy in the left breast at 6:00, 8 cm from the nipple. Ultrasound-guided biopsy on 3/4/21 showed left breast at 6:00 an Invasive well  differentiated ductal carcinoma, Page score 5/9 (2 + 2 + 1) measuring at least 0.6 cm, rare isolated microcalcifications present in the tumor, atypical duct epithelial hyperplasia in one duct ER/[place] 90% positive, HER-2 Negative. Breast MRI also revealed a right breast abnormality and an MRI guided core biopsy on 3/18/21 showed a complex atypical papillary lesion in right, inferior central to medial breast. On 3/30 21 she underwent bilateral partial mastectomies and surgical pathology revealed RIGHT breast ductal carcinoma in Situ in 1/12 blocks, measuring 5 mm, cribriform and micropapillary type, low nuclear grade, margins negative with focal margin <1 mm. Lymph nodes not examined. LEFT breast pathology showed a moderately differentiated invasive ductal carcinoma SBR 6/9 measuring 1.1 cm, unifocal with DCIS present, solid, cribriform and flat type, grade intermediate. LCIS identified. Margins for invasive tumor negative and  > 5mm. No LVI and margins for DCIS negative, 1.5mm. 0/3 SLN involved. ER, [place] 90% positive, HER2 Negative. Received COVID vaccine. Oncotype DX and Genetic panel testing pending. She presents today to discuss the role of radiation therapy in her care. \par She presents for OTV #2/16 Denies pain, feels well. Using Aloe to moisturize.\par

## 2021-05-18 NOTE — PHYSICAL EXAM
[Symmetric] : breasts are symmetric [Breast Abnormal Lactation (Galactorrhea)] : no nipple discharge [No UE Edema] : there is no upper extremity edema [Normal] : oriented to person, place and time, the affect was normal, the mood was normal and not anxious [de-identified] : bilateral well healing inf circumareolar scars with yellow ecchymosis healing well

## 2021-05-19 PROCEDURE — 99072 ADDL SUPL MATRL&STAF TM PHE: CPT

## 2021-05-19 PROCEDURE — G6012: CPT

## 2021-05-19 PROCEDURE — G6017: CPT

## 2021-05-20 PROCEDURE — G6017: CPT

## 2021-05-20 PROCEDURE — 99072 ADDL SUPL MATRL&STAF TM PHE: CPT

## 2021-05-20 PROCEDURE — 77336 RADIATION PHYSICS CONSULT: CPT

## 2021-05-20 PROCEDURE — 77417 THER RADIOLOGY PORT IMAGE(S): CPT

## 2021-05-20 PROCEDURE — G6012: CPT

## 2021-05-21 PROCEDURE — 99072 ADDL SUPL MATRL&STAF TM PHE: CPT

## 2021-05-21 PROCEDURE — G6002: CPT | Mod: 59

## 2021-05-21 PROCEDURE — G6012: CPT

## 2021-05-24 PROCEDURE — 77427 RADIATION TX MANAGEMENT X5: CPT

## 2021-05-24 PROCEDURE — G6012: CPT

## 2021-05-24 PROCEDURE — 99072 ADDL SUPL MATRL&STAF TM PHE: CPT

## 2021-05-24 PROCEDURE — G6017: CPT

## 2021-05-25 ENCOUNTER — NON-APPOINTMENT (OUTPATIENT)
Age: 61
End: 2021-05-25

## 2021-05-25 VITALS — BODY MASS INDEX: 31.82 KG/M2 | HEIGHT: 65 IN | WEIGHT: 191 LBS

## 2021-05-25 PROCEDURE — G6017: CPT

## 2021-05-25 PROCEDURE — 99072 ADDL SUPL MATRL&STAF TM PHE: CPT

## 2021-05-25 PROCEDURE — G6012: CPT

## 2021-05-25 NOTE — DISEASE MANAGEMENT
[Pathological] : TNM Stage: p [IA] : IA [TTNM] : 1c [NTNM] : 0 [MTNM] : 0 [de-identified] : [id number] [[place] [address]] : 7615 [de-identified] : left breast

## 2021-05-25 NOTE — PHYSICAL EXAM
[Symmetric] : breasts are symmetric [Breast Abnormal Lactation (Galactorrhea)] : no nipple discharge [No UE Edema] : there is no upper extremity edema [Normal] : oriented to person, place and time, the affect was normal, the mood was normal and not anxious [de-identified] : bilateral well healing inf circumareolar scars. Grade 0 erythema b/l breasts

## 2021-05-25 NOTE — HISTORY OF PRESENT ILLNESS
[FreeTextEntry1] : The patient is a pleasant 61 year old female diagnosed with left breast invasive ductal carcinoma and right breast DCIS. She has a history of a right excisional biopsy in 2013 for LCIS with Dr. Matias. She was having regular breast imaging and followed up with Dr. Matias regularly but she missed 2020 mammo due to COVID-19 pandemic. Her recent screening mammo/sono on 1/24/21 showed a left breast mass and right breast asymmetry. Nodule at 6:00 in left breast for which targeted diagnostic ultrasound was recommended. Diagnostic mammo/sono done on 2/23/21 revealed changes suspicious for malignancy in the left breast at 6:00, 8 cm from the nipple. Ultrasound-guided biopsy on 3/4/21 showed left breast at 6:00 an Invasive well  differentiated ductal carcinoma, Page score 5/9 (2 + 2 + 1) measuring at least 0.6 cm, rare isolated microcalcifications present in the tumor, atypical duct epithelial hyperplasia in one duct ER/[place] 90% positive, HER-2 Negative. Breast MRI also revealed a right breast abnormality and an MRI guided core biopsy on 3/18/21 showed a complex atypical papillary lesion in right, inferior central to medial breast. On 3/30 21 she underwent bilateral partial mastectomies and surgical pathology revealed RIGHT breast ductal carcinoma in Situ in 1/12 blocks, measuring 5 mm, cribriform and micropapillary type, low nuclear grade, margins negative with focal margin <1 mm. Lymph nodes not examined. LEFT breast pathology showed a moderately differentiated invasive ductal carcinoma SBR 6/9 measuring 1.1 cm, unifocal with DCIS present, solid, cribriform and flat type, grade intermediate. LCIS identified. Margins for invasive tumor negative and  > 5mm. No LVI and margins for DCIS negative, 1.5mm. 0/3 SLN involved. ER, [place] 90% positive, HER2 Negative. Received COVID vaccine. Oncotype DX and Genetic panel testing pending. She presents today to discuss the role of radiation therapy in her care. \par \par She presents for OTV #7/16 Denies pain, feels well. Using Aloe to moisturize.\par

## 2021-05-26 PROCEDURE — G6012: CPT

## 2021-05-26 PROCEDURE — 99072 ADDL SUPL MATRL&STAF TM PHE: CPT

## 2021-05-26 PROCEDURE — 77417 THER RADIOLOGY PORT IMAGE(S): CPT

## 2021-05-27 PROCEDURE — G6017: CPT

## 2021-05-27 PROCEDURE — G6012: CPT

## 2021-05-27 PROCEDURE — 99072 ADDL SUPL MATRL&STAF TM PHE: CPT

## 2021-05-28 PROCEDURE — G6012: CPT

## 2021-05-28 PROCEDURE — 99072 ADDL SUPL MATRL&STAF TM PHE: CPT

## 2021-05-28 PROCEDURE — G6017: CPT

## 2021-06-01 ENCOUNTER — NON-APPOINTMENT (OUTPATIENT)
Age: 61
End: 2021-06-01

## 2021-06-01 VITALS — HEIGHT: 65 IN | BODY MASS INDEX: 31.65 KG/M2 | WEIGHT: 190 LBS

## 2021-06-01 PROCEDURE — G6017: CPT

## 2021-06-01 PROCEDURE — 77336 RADIATION PHYSICS CONSULT: CPT

## 2021-06-01 PROCEDURE — 77427 RADIATION TX MANAGEMENT X5: CPT

## 2021-06-01 PROCEDURE — 99072 ADDL SUPL MATRL&STAF TM PHE: CPT

## 2021-06-01 PROCEDURE — G6012: CPT

## 2021-06-01 NOTE — PHYSICAL EXAM
[Symmetric] : breasts are symmetric [Breast Abnormal Lactation (Galactorrhea)] : no nipple discharge [No UE Edema] : there is no upper extremity edema [Normal] : oriented to person, place and time, the affect was normal, the mood was normal and not anxious [de-identified] : bilateral well healing inf circumareolar scars. Grade 1 erythema b/l breasts symmetric

## 2021-06-01 NOTE — HISTORY OF PRESENT ILLNESS
[FreeTextEntry1] : The patient is a pleasant 61 year old female diagnosed with left breast invasive ductal carcinoma and right breast DCIS. She has a history of a right excisional biopsy in 2013 for LCIS with Dr. Matias. She was having regular breast imaging and followed up with Dr. Matias regularly but she missed 2020 mammo due to COVID-19 pandemic. Her recent screening mammo/sono on 1/24/21 showed a left breast mass and right breast asymmetry. Nodule at 6:00 in left breast for which targeted diagnostic ultrasound was recommended. Diagnostic mammo/sono done on 2/23/21 revealed changes suspicious for malignancy in the left breast at 6:00, 8 cm from the nipple. Ultrasound-guided biopsy on 3/4/21 showed left breast at 6:00 an Invasive well  differentiated ductal carcinoma, Page score 5/9 (2 + 2 + 1) measuring at least 0.6 cm, rare isolated microcalcifications present in the tumor, atypical duct epithelial hyperplasia in one duct ER/[place] 90% positive, HER-2 Negative. Breast MRI also revealed a right breast abnormality and an MRI guided core biopsy on 3/18/21 showed a complex atypical papillary lesion in right, inferior central to medial breast. On 3/30 21 she underwent bilateral partial mastectomies and surgical pathology revealed RIGHT breast ductal carcinoma in Situ in 1/12 blocks, measuring 5 mm, cribriform and micropapillary type, low nuclear grade, margins negative with focal margin <1 mm. Lymph nodes not examined. LEFT breast pathology showed a moderately differentiated invasive ductal carcinoma SBR 6/9 measuring 1.1 cm, unifocal with DCIS present, solid, cribriform and flat type, grade intermediate. LCIS identified. Margins for invasive tumor negative and  > 5mm. No LVI and margins for DCIS negative, 1.5mm. 0/3 SLN involved. ER, [place] 90% positive, HER2 Negative. Received COVID vaccine. Oncotype DX and Genetic panel testing pending. She presents today to discuss the role of radiation therapy in her care. \par \par She presents for OTV #11/16 Denies pain, feels well. Using Aloe to moisturize. Nipple sensitivity. \par

## 2021-06-01 NOTE — DISEASE MANAGEMENT
[Pathological] : TNM Stage: p [IA] : IA [TTNM] : 1c [NTNM] : 0 [MTNM] : 0 [de-identified] : [id number] [[place] [address]] : 9064 [de-identified] : left breast

## 2021-06-02 PROCEDURE — G6017: CPT

## 2021-06-02 PROCEDURE — G6012: CPT

## 2021-06-02 PROCEDURE — 77417 THER RADIOLOGY PORT IMAGE(S): CPT

## 2021-06-02 PROCEDURE — 99072 ADDL SUPL MATRL&STAF TM PHE: CPT

## 2021-06-03 PROCEDURE — G6012: CPT

## 2021-06-03 PROCEDURE — 99072 ADDL SUPL MATRL&STAF TM PHE: CPT

## 2021-06-03 PROCEDURE — G6017: CPT

## 2021-06-04 ENCOUNTER — APPOINTMENT (OUTPATIENT)
Dept: RADIOLOGY | Facility: CLINIC | Age: 61
End: 2021-06-04
Payer: COMMERCIAL

## 2021-06-04 ENCOUNTER — OUTPATIENT (OUTPATIENT)
Dept: OUTPATIENT SERVICES | Facility: HOSPITAL | Age: 61
LOS: 1 days | End: 2021-06-04
Payer: COMMERCIAL

## 2021-06-04 DIAGNOSIS — Z00.8 ENCOUNTER FOR OTHER GENERAL EXAMINATION: ICD-10-CM

## 2021-06-04 DIAGNOSIS — C50.911 MALIGNANT NEOPLASM OF UNSPECIFIED SITE OF RIGHT FEMALE BREAST: ICD-10-CM

## 2021-06-04 PROCEDURE — 99072 ADDL SUPL MATRL&STAF TM PHE: CPT

## 2021-06-04 PROCEDURE — G6012: CPT

## 2021-06-04 PROCEDURE — 77085 DXA BONE DENSITY AXL VRT FX: CPT | Mod: 26

## 2021-06-04 PROCEDURE — 77085 DXA BONE DENSITY AXL VRT FX: CPT

## 2021-06-04 PROCEDURE — 77336 RADIATION PHYSICS CONSULT: CPT

## 2021-06-04 PROCEDURE — G6017: CPT

## 2021-06-07 ENCOUNTER — APPOINTMENT (OUTPATIENT)
Dept: INTERNAL MEDICINE | Facility: CLINIC | Age: 61
End: 2021-06-07
Payer: COMMERCIAL

## 2021-06-07 VITALS
WEIGHT: 193.2 LBS | SYSTOLIC BLOOD PRESSURE: 116 MMHG | HEIGHT: 65 IN | HEART RATE: 62 BPM | DIASTOLIC BLOOD PRESSURE: 80 MMHG | TEMPERATURE: 97.9 F | RESPIRATION RATE: 16 BRPM | BODY MASS INDEX: 32.19 KG/M2 | OXYGEN SATURATION: 97 %

## 2021-06-07 PROCEDURE — 99072 ADDL SUPL MATRL&STAF TM PHE: CPT

## 2021-06-07 PROCEDURE — G6012: CPT

## 2021-06-07 PROCEDURE — 77417 THER RADIOLOGY PORT IMAGE(S): CPT

## 2021-06-07 PROCEDURE — 99214 OFFICE O/P EST MOD 30 MIN: CPT

## 2021-06-07 RX ORDER — OMEPRAZOLE 40 MG/1
40 CAPSULE, DELAYED RELEASE ORAL TWICE DAILY
Qty: 180 | Refills: 1 | Status: DISCONTINUED | COMMUNITY
Start: 2019-10-08 | End: 2021-06-07

## 2021-06-07 NOTE — HISTORY OF PRESENT ILLNESS
[FreeTextEntry1] : Rx for fasting blood work and general follow-up [de-identified] : Patient is a 61 year old female with a past medical history as below who presents for Rx for fasting blood work and general follow-up. Mammogram dated 2/23/21 revealed scattered areas of fibroglandular density; in the lower slightly inner left breast, a spiculated mass persists on additional views, containing some microcalcifications; area of asymmetry in the medial right breast appears to efface on the additional views, compatible with benign fibroglandular tissue. Bilateral Breast US dated 2/23/21 revealed heterogeneous background echotexture (mixed fatty and fibroglandular) in both breasts; in left breast, at 6:00, 8 cm from nipple a 7 x 5 x 5 mm hypoechoic mass with irregular margins and containing some microcalcifications was identified. US-guided biopsy was recommended; Pathology demonstrated left breast invasive ductal carcinoma and right breast DCIS. Patient is tolerating radiation treatments as expected as per radiation oncologist, Dr. Pickard. Patient will be starting Anastrozole (Arimidex) after completing radiation therapy. Patient attributes recent weight loss to decreasing carbohydrates/sugars in her diet, consuming smaller portions, and increasing CV activity (walking). Patient has received both doses of the COVID-19 Vaccine.

## 2021-06-07 NOTE — PHYSICAL EXAM
[No Acute Distress] : no acute distress [Well Nourished] : well nourished [Well Developed] : well developed [Well-Appearing] : well-appearing [Normal Sclera/Conjunctiva] : normal sclera/conjunctiva [PERRL] : pupils equal round and reactive to light [EOMI] : extraocular movements intact [Normal Outer Ear/Nose] : the outer ears and nose were normal in appearance [Normal Oropharynx] : the oropharynx was normal [No JVD] : no jugular venous distention [No Lymphadenopathy] : no lymphadenopathy [Supple] : supple [Thyroid Normal, No Nodules] : the thyroid was normal and there were no nodules present [No Respiratory Distress] : no respiratory distress  [No Accessory Muscle Use] : no accessory muscle use [Clear to Auscultation] : lungs were clear to auscultation bilaterally [Normal Rate] : normal rate  [Regular Rhythm] : with a regular rhythm [Normal S1, S2] : normal S1 and S2 [No Murmur] : no murmur heard [No Carotid Bruits] : no carotid bruits [No Abdominal Bruit] : a ~M bruit was not heard ~T in the abdomen [No Varicosities] : no varicosities [Pedal Pulses Present] : the pedal pulses are present [No Edema] : there was no peripheral edema [No Palpable Aorta] : no palpable aorta [No Extremity Clubbing/Cyanosis] : no extremity clubbing/cyanosis [Soft] : abdomen soft [Non Tender] : non-tender [Non-distended] : non-distended [No Masses] : no abdominal mass palpated [No HSM] : no HSM [Normal Bowel Sounds] : normal bowel sounds [Normal Posterior Cervical Nodes] : no posterior cervical lymphadenopathy [Normal Anterior Cervical Nodes] : no anterior cervical lymphadenopathy [No CVA Tenderness] : no CVA  tenderness [No Spinal Tenderness] : no spinal tenderness [No Joint Swelling] : no joint swelling [Grossly Normal Strength/Tone] : grossly normal strength/tone [No Rash] : no rash [Coordination Grossly Intact] : coordination grossly intact [No Focal Deficits] : no focal deficits [Normal Gait] : normal gait [Deep Tendon Reflexes (DTR)] : deep tendon reflexes were 2+ and symmetric [Normal Affect] : the affect was normal [Normal Insight/Judgement] : insight and judgment were intact [de-identified] : obese

## 2021-06-07 NOTE — HEALTH RISK ASSESSMENT
[No] : In the past 12 months have you used drugs other than those required for medical reasons? No [0] : 2) Feeling down, depressed, or hopeless: Not at all (0) [] : No [Audit-CScore] : 0 [de-identified] : Walking. [de-identified] : Low carb/sugar.  [[id number]] : 0 [MammogramDate] : 02/21 [MammogramComments] : BI-RADS 4C - Suspicious Finding(s) - High Suspicion for Malignancy. [BoneDensityDate] : 06/21 [BoneDensityComments] : Normal bone mass. [ColonoscopyDate] : 12/19 [ColonoscopyComments] : Revealed small colon polyp and internal hemorrhoids.  [HIVDate] : 10/20 [HIVComments] : Nonreactive. [HepatitisCDate] : 10/20 [HepatitisCComments] : Nonreactive.

## 2021-06-07 NOTE — ASSESSMENT
[FreeTextEntry1] : Patient is a 61 year old female with a past medical history as above who presents for Rx for fasting blood work and general follow-up.

## 2021-06-07 NOTE — REVIEW OF SYSTEMS
[Negative] : Heme/Lymph [Recent Change In Weight] : ~T recent weight change [FreeTextEntry2] : weight loss

## 2021-06-07 NOTE — PLAN
[FreeTextEntry1] : Cardiology\par history of hyperlipidemia - continue low cholesterol/low fat diet - check FLP\par Endocrinology\par hyperglycemia - continue low carbohydrate/low sugar diet and CV exercise - check HGB A1C\par overweight - continue low carbohydrate diet and CV exercise\par Vitamin D deficiency - continue Vitamin D-3 61646 IU p.o. with meals as directed - check Vitamin D \par Oncology\par bilateral breast cancer - continue radiation therapy; patient to start Anastrozole (Arimidex) 1mg 2 weeks after finishing last radiation treatment - continue to follow up with oncologist, Dr. Shaikh and radiation oncologist, Dr. Pickard \par Psychiatry\par increased stress/anxiety - secondary to recent diagnosis of bilateral breast cancer - discussed initiating pharmacotherapy; also discussed referral to therapist; advised patient to follow up if she believes treatment becomes warranted \par Immunization\par Pneumovax 23 - discussed given history of asthma, patient to consider and follow up\par \par Rx given for blood work (FLP, HGB A1C, TSH, and Vitamin D).

## 2021-06-07 NOTE — ADDENDUM
[FreeTextEntry1] : I, Torito Stoll, acted solely as scribe for Dr. Juan Pablo Marx DO on this date 06/07/2021  3:00PM .\par \par All medical record entries made by the Scribe were at my, Dr. Juan Pablo Marx DO direction and personally dictated by me on 06/07/2021  3:00PM. I have reviewed the chart and agree that the record accurately reflects my personal performance of the history, physical exam, assessment and plan. I have also personally directed, reviewed and agreed with the chart.\par

## 2021-06-08 ENCOUNTER — NON-APPOINTMENT (OUTPATIENT)
Age: 61
End: 2021-06-08

## 2021-06-08 VITALS — WEIGHT: 189 LBS | BODY MASS INDEX: 31.49 KG/M2 | HEIGHT: 65 IN

## 2021-06-08 PROCEDURE — 77280 THER RAD SIMULAJ FIELD SMPL: CPT

## 2021-06-08 PROCEDURE — G6012: CPT

## 2021-06-08 PROCEDURE — 99072 ADDL SUPL MATRL&STAF TM PHE: CPT

## 2021-06-08 PROCEDURE — 77427 RADIATION TX MANAGEMENT X5: CPT

## 2021-06-08 PROCEDURE — G6017: CPT

## 2021-06-08 NOTE — HISTORY OF PRESENT ILLNESS
[FreeTextEntry1] : The patient is a pleasant 61 year old female diagnosed with left breast invasive ductal carcinoma and right breast DCIS. She has a history of a right excisional biopsy in 2013 for LCIS with Dr. Matias. She was having regular breast imaging and followed up with Dr. Matias regularly but she missed 2020 mammo due to COVID-19 pandemic. Her recent screening mammo/sono on 1/24/21 showed a left breast mass and right breast asymmetry. Nodule at 6:00 in left breast for which targeted diagnostic ultrasound was recommended. Diagnostic mammo/sono done on 2/23/21 revealed changes suspicious for malignancy in the left breast at 6:00, 8 cm from the nipple. Ultrasound-guided biopsy on 3/4/21 showed left breast at 6:00 an Invasive well  differentiated ductal carcinoma, Page score 5/9 (2 + 2 + 1) measuring at least 0.6 cm, rare isolated microcalcifications present in the tumor, atypical duct epithelial hyperplasia in one duct ER/[place] 90% positive, HER-2 Negative. Breast MRI also revealed a right breast abnormality and an MRI guided core biopsy on 3/18/21 showed a complex atypical papillary lesion in right, inferior central to medial breast. On 3/30 21 she underwent bilateral partial mastectomies and surgical pathology revealed RIGHT breast ductal carcinoma in Situ in 1/12 blocks, measuring 5 mm, cribriform and micropapillary type, low nuclear grade, margins negative with focal margin <1 mm. Lymph nodes not examined. LEFT breast pathology showed a moderately differentiated invasive ductal carcinoma SBR 6/9 measuring 1.1 cm, unifocal with DCIS present, solid, cribriform and flat type, grade intermediate. LCIS identified. Margins for invasive tumor negative and  > 5mm. No LVI and margins for DCIS negative, 1.5mm. 0/3 SLN involved. ER, [place] 90% positive, HER2 Negative. Received COVID vaccine. Oncotype DX and Genetic panel testing pending. She presents today to discuss the role of radiation therapy in her care. \par \par She presents for OTV #16/16,+4 boosts. Using Aloe to moisturize. Nipple sensitivity. Slight pain noted under the left breast with sensitivity and erythema. Using aloe, A and D and vit C.\par

## 2021-06-08 NOTE — REVIEW OF SYSTEMS
[Skin Hyperpigmentation: Grade 1 - Hyperpigmentation covering <10% BSA; no psychosocial impact] : Skin Hyperpigmentation: Grade 1 - Hyperpigmentation covering <10% BSA; no psychosocial impact [Dermatitis Radiation: Grade 1 - Faint erythema or dry desquamation] : Dermatitis Radiation: Grade 1 - Faint erythema or dry desquamation

## 2021-06-08 NOTE — DISEASE MANAGEMENT
[Pathological] : TNM Stage: p [IA] : IA [TTNM] : 1c [NTNM] : 0 [MTNM] : 0 [de-identified] : [id number] [[place] [address]] : 8141 [de-identified] : left breast, right breast

## 2021-06-08 NOTE — PHYSICAL EXAM
[Symmetric] : breasts are symmetric [Breast Abnormal Lactation (Galactorrhea)] : no nipple discharge [No UE Edema] : there is no upper extremity edema [Normal] : oriented to person, place and time, the affect was normal, the mood was normal and not anxious [de-identified] : bilateral well healing inf circumareolar scars. Grade 1 erythema b/l breasts symmetric

## 2021-06-08 NOTE — VITALS
[Maximal Pain Intensity: 3/10] : 3/10 [80: Normal activity with effort; some signs or symptoms of disease.] : 80: Normal activity with effort; some signs or symptoms of disease.

## 2021-06-09 PROCEDURE — G6012: CPT

## 2021-06-10 ENCOUNTER — LABORATORY RESULT (OUTPATIENT)
Age: 61
End: 2021-06-10

## 2021-06-10 PROCEDURE — G6012: CPT

## 2021-06-10 PROCEDURE — 77336 RADIATION PHYSICS CONSULT: CPT

## 2021-06-10 PROCEDURE — 99072 ADDL SUPL MATRL&STAF TM PHE: CPT

## 2021-06-11 PROCEDURE — 99072 ADDL SUPL MATRL&STAF TM PHE: CPT

## 2021-06-11 PROCEDURE — G6012: CPT

## 2021-06-13 ENCOUNTER — NON-APPOINTMENT (OUTPATIENT)
Age: 61
End: 2021-06-13

## 2021-06-14 PROCEDURE — G6012: CPT

## 2021-06-14 PROCEDURE — 77336 RADIATION PHYSICS CONSULT: CPT

## 2021-06-14 PROCEDURE — G6017: CPT

## 2021-06-14 PROCEDURE — 99072 ADDL SUPL MATRL&STAF TM PHE: CPT

## 2021-07-22 ENCOUNTER — APPOINTMENT (OUTPATIENT)
Dept: RADIATION ONCOLOGY | Facility: CLINIC | Age: 61
End: 2021-07-22
Payer: COMMERCIAL

## 2021-07-22 VITALS
DIASTOLIC BLOOD PRESSURE: 74 MMHG | SYSTOLIC BLOOD PRESSURE: 118 MMHG | WEIGHT: 186 LBS | HEIGHT: 65 IN | BODY MASS INDEX: 30.99 KG/M2

## 2021-07-22 DIAGNOSIS — N63.20 UNSPECIFIED LUMP IN THE LEFT BREAST, UNSPECIFIED QUADRANT: ICD-10-CM

## 2021-07-22 PROCEDURE — 99024 POSTOP FOLLOW-UP VISIT: CPT

## 2021-07-22 RX ORDER — SILVER SULFADIAZINE 10 MG/G
1 CREAM TOPICAL TWICE DAILY
Qty: 1 | Refills: 3 | Status: DISCONTINUED | COMMUNITY
Start: 2021-06-08 | End: 2021-07-22

## 2021-07-22 NOTE — HISTORY OF PRESENT ILLNESS
[FreeTextEntry1] : The patient is a pleasant 61 year old female diagnosed with left breast invasive ductal carcinoma and right breast DCIS. She has a history of a right excisional biopsy in 2013 for LCIS with Dr. Matias. She was having regular breast imaging and followed up with Dr. Matias regularly but she missed 2020 mammo due to COVID-19 pandemic. Her recent screening mammo/sono on 1/24/21 showed a left breast mass and right breast asymmetry. Nodule at 6:00 in left breast for which targeted diagnostic ultrasound was recommended. Diagnostic mammo/sono done on 2/23/21 revealed changes suspicious for malignancy in the left breast at 6:00, 8 cm from the nipple. Ultrasound-guided biopsy on 3/4/21 showed left breast at 6:00 an Invasive well  differentiated ductal carcinoma, Page score 5/9 (2 + 2 + 1) measuring at least 0.6 cm, rare isolated microcalcifications present in the tumor, atypical duct epithelial hyperplasia in one duct ER/[place] 90% positive, HER-2 Negative. Breast MRI also revealed a right breast abnormality and an MRI guided core biopsy on 3/18/21 showed a complex atypical papillary lesion in right, inferior central to medial breast. On 3/30 21 she underwent bilateral partial mastectomies and surgical pathology revealed RIGHT breast ductal carcinoma in Situ in 1/12 blocks, measuring 5 mm, cribriform and micropapillary type, low nuclear grade, margins negative with focal margin <1 mm. Lymph nodes not examined. LEFT breast pathology showed a moderately differentiated invasive ductal carcinoma SBR 6/9 measuring 1.1 cm, unifocal with DCIS present, solid, cribriform and flat type, grade intermediate. LCIS identified. Margins for invasive tumor negative and  > 5mm. No LVI and margins for DCIS negative, 1.5mm. 0/3 SLN involved. ER, [place] 90% positive, HER2 Negative. Received COVID vaccine. Oncotype DX and Genetic panel testing pending. She presents today to discuss the role of radiation therapy in her care. She completed a dose of 4240 cGy, plus 1,000 cGy boost, to the left and right breast on 6/14/21.\par \par She presents for a one month PTE. Developed desquamation left IMF following tx which resolved.  Feeling well. Denies pain. Using Aloe, A, D, C to moisturize. She was prescribed anastrazole and reports hot flashes. \par

## 2021-07-22 NOTE — PHYSICAL EXAM
[Normal] : well developed, well nourished, in no acute distress [de-identified] : resolving hyperpigemntation b/l breasts

## 2021-08-23 ENCOUNTER — OUTPATIENT (OUTPATIENT)
Dept: OUTPATIENT SERVICES | Facility: HOSPITAL | Age: 61
LOS: 1 days | Discharge: HOME | End: 2021-08-23

## 2021-08-23 DIAGNOSIS — C50.919 MALIGNANT NEOPLASM OF UNSPECIFIED SITE OF UNSPECIFIED FEMALE BREAST: ICD-10-CM

## 2021-08-24 ENCOUNTER — APPOINTMENT (OUTPATIENT)
Dept: HEMATOLOGY ONCOLOGY | Facility: CLINIC | Age: 61
End: 2021-08-24

## 2021-09-21 ENCOUNTER — APPOINTMENT (OUTPATIENT)
Dept: HEMATOLOGY ONCOLOGY | Facility: CLINIC | Age: 61
End: 2021-09-21

## 2021-09-21 ENCOUNTER — RESULT REVIEW (OUTPATIENT)
Age: 61
End: 2021-09-21

## 2021-09-21 ENCOUNTER — APPOINTMENT (OUTPATIENT)
Dept: HEMATOLOGY ONCOLOGY | Facility: CLINIC | Age: 61
End: 2021-09-21
Payer: COMMERCIAL

## 2021-09-21 VITALS
DIASTOLIC BLOOD PRESSURE: 77 MMHG | HEIGHT: 64.96 IN | TEMPERATURE: 98.2 F | OXYGEN SATURATION: 96 % | WEIGHT: 189.59 LBS | HEART RATE: 81 BPM | RESPIRATION RATE: 16 BRPM | BODY MASS INDEX: 31.59 KG/M2 | SYSTOLIC BLOOD PRESSURE: 115 MMHG

## 2021-09-21 DIAGNOSIS — T50.905A OTHER SPECIFIED NONSCARRING HAIR LOSS: ICD-10-CM

## 2021-09-21 DIAGNOSIS — L65.8 OTHER SPECIFIED NONSCARRING HAIR LOSS: ICD-10-CM

## 2021-09-21 LAB
BASOPHILS # BLD AUTO: 0.04 K/UL — SIGNIFICANT CHANGE UP (ref 0–0.2)
BASOPHILS NFR BLD AUTO: 0.6 % — SIGNIFICANT CHANGE UP (ref 0–2)
EOSINOPHIL # BLD AUTO: 0.26 K/UL — SIGNIFICANT CHANGE UP (ref 0–0.5)
EOSINOPHIL NFR BLD AUTO: 3.9 % — SIGNIFICANT CHANGE UP (ref 0–6)
HCT VFR BLD CALC: 45 % — SIGNIFICANT CHANGE UP (ref 34.5–45)
HGB BLD-MCNC: 14 G/DL — SIGNIFICANT CHANGE UP (ref 11.5–15.5)
IMM GRANULOCYTES NFR BLD AUTO: 0.3 % — SIGNIFICANT CHANGE UP (ref 0–1.5)
LYMPHOCYTES # BLD AUTO: 1.28 K/UL — SIGNIFICANT CHANGE UP (ref 1–3.3)
LYMPHOCYTES # BLD AUTO: 19.1 % — SIGNIFICANT CHANGE UP (ref 13–44)
MCHC RBC-ENTMCNC: 27.9 PG — SIGNIFICANT CHANGE UP (ref 27–34)
MCHC RBC-ENTMCNC: 31.1 G/DL — LOW (ref 32–36)
MCV RBC AUTO: 89.6 FL — SIGNIFICANT CHANGE UP (ref 80–100)
MONOCYTES # BLD AUTO: 0.64 K/UL — SIGNIFICANT CHANGE UP (ref 0–0.9)
MONOCYTES NFR BLD AUTO: 9.6 % — SIGNIFICANT CHANGE UP (ref 2–14)
NEUTROPHILS # BLD AUTO: 4.45 K/UL — SIGNIFICANT CHANGE UP (ref 1.8–7.4)
NEUTROPHILS NFR BLD AUTO: 66.5 % — SIGNIFICANT CHANGE UP (ref 43–77)
NRBC # BLD: 0 /100 WBCS — SIGNIFICANT CHANGE UP (ref 0–0)
PLATELET # BLD AUTO: 270 K/UL — SIGNIFICANT CHANGE UP (ref 150–400)
RBC # BLD: 5.02 M/UL — SIGNIFICANT CHANGE UP (ref 3.8–5.2)
RBC # FLD: 13.2 % — SIGNIFICANT CHANGE UP (ref 10.3–14.5)
WBC # BLD: 6.69 K/UL — SIGNIFICANT CHANGE UP (ref 3.8–10.5)
WBC # FLD AUTO: 6.69 K/UL — SIGNIFICANT CHANGE UP (ref 3.8–10.5)

## 2021-09-21 PROCEDURE — 99214 OFFICE O/P EST MOD 30 MIN: CPT

## 2021-09-22 ENCOUNTER — APPOINTMENT (OUTPATIENT)
Dept: SURGERY | Facility: CLINIC | Age: 61
End: 2021-09-22
Payer: COMMERCIAL

## 2021-09-22 LAB
ALBUMIN SERPL ELPH-MCNC: 4.6 G/DL
ALP BLD-CCNC: 92 U/L
ALT SERPL-CCNC: 22 U/L
ANION GAP SERPL CALC-SCNC: 13 MMOL/L
AST SERPL-CCNC: 21 U/L
BILIRUB SERPL-MCNC: 0.6 MG/DL
BUN SERPL-MCNC: 22 MG/DL
CALCIUM SERPL-MCNC: 9.8 MG/DL
CHLORIDE SERPL-SCNC: 104 MMOL/L
CO2 SERPL-SCNC: 26 MMOL/L
CREAT SERPL-MCNC: 0.89 MG/DL
ESTRADIOL SERPL-MCNC: <5 PG/ML
FSH SERPL-MCNC: 53.5 IU/L
GLUCOSE SERPL-MCNC: 110 MG/DL
LH SERPL-ACNC: 23.6 IU/L
POTASSIUM SERPL-SCNC: 4.4 MMOL/L
PROT SERPL-MCNC: 7.3 G/DL
SODIUM SERPL-SCNC: 143 MMOL/L

## 2021-09-22 PROCEDURE — 99213K: CUSTOM

## 2021-09-22 NOTE — REVIEW OF SYSTEMS
[Negative] : Allergic/Immunologic [Night Sweats] : night sweats [FreeTextEntry2] : hotflashes, insomnia [de-identified] : headaches [de-identified] : hair thinning

## 2021-09-22 NOTE — ASSESSMENT
[Curative] : Goals of care discussed with patient: Curative [FreeTextEntry1] : 61 y/o post-menopausal female diagnosed with left breast stage IA ER/OH+, HER2- cancer as well as right breast DCIS diagnosed 3/2021.  She is s/p bilateral lumpectomy and left SLNB with Dr. Matias.  Oncotype Dx recurrence score 20 (~1.6 CT benefit).  s/p RT; Started endocrine therapy anastrozole 6/21/21\par \par * Discussed w/ patient management of side effects associated w/ anastrozole vs. changing to a different AI; patient would like to continue w/ anastrozole at this time.  \par * Headaches - not associated w/ nausea/vomiting/blurry vision/dizziness - not interfering with QOL occasionally nees to take tylenol ; not clinically worrisome - try changing time of day taking anastrozole; \par * Hot Flashes/Night Sweats/Insomnia -  REcommend Relizen; could also consider gabapentin if needed\par * Hair thinning - reviewed recent bw including TSH which is wnl;  rec: Biotin daily \par * Telehealth in 1 mth w/ Dr. Segura to discuss symptomatic AI management\par * RTO 3 months for full exam \par * f/up w/ Dr. Matias to discuss timing of ongoing breast imaging\par * Patient encouraged to contact us if she has any questions or concerns\par * Dr. Segura present for determination of ongoing plan of care. \par \par \par

## 2021-09-22 NOTE — PHYSICAL EXAM
[Fully active, able to carry on all pre-disease performance without restriction] : Status 0 - Fully active, able to carry on all pre-disease performance without restriction [Normal] : grossly intact [de-identified] : b/l lumpectomies, minimal hyperpigmentation secondary to RT , no palpable masses or adenopathy b/l

## 2021-09-22 NOTE — HISTORY OF PRESENT ILLNESS
[Disease: _____________________] : Disease: [unfilled] [T: ___] : T[unfilled] [N: ___] : N[unfilled] [AJCC Stage: ____] : AJCC Stage: [unfilled] [de-identified] : 61 y/o female who presents for initial breast medical oncology consultation.\par \par The patient has history of right excisional biopsy for LCIS on R in 2013.  \par \par Was having alternating mammograms/US and MRI - delayed due to COVID.\par \par The patient had routine mammogram and sonogram 1/18/21 followed by additional imaging 2/23/21 that showed at 6:00, 8 cm from the nipple a 7 x 5 x 5 mm hypoechoic mass with irregular margins, and containing some microcalcifications. Ultrasound-guided biopsy was recommended.\par \par Left breast ultrasound-guided biopsy was done 3/4/21.  Pathology came back as invasive well differentiated ductal carcinoma.  [place] score 5/9 (2 + 2 + 1) in this limited material. Invasive tumor measures at least 0.6 cm on the slide.  Rare isolated microcalcifications present in the tumor.   Atypical duct epithelial hyperplasia in one duct. ER positive (90%), UT positive (90%), HER2 negative.\par \par She was sent for breast MRI,  3/16/21 that showed biopsy-proven malignancy in the lower central left breast, measuring 1.2 x 0.9 x 0.9 cm.  7 mm enhancing mass in the lower central right breast. MRI guided biopsy is recommended.  A 4 mm enhancing dermal lesion in the lower outer left breast. Direct inspection is recommended.  BIRADS 4B\par \par MRI-guided biopsy right breast was done 3/18/21 and revealed a complex atypical papillary lesion.\par \par The patient underwent a bilateral lumpectomy and left SLNB with Dr. Matias on 3/30/21.  \par \par Final pathology of right lumpectomy: DCIS, cribriform and micropapillary types with low nuclear grade. ER positive (>98%), UT positive (90%).  \par \par Left lumpectomy with SLNB final pathology: IDC, moderately differentiated (size: 1.1 cm).  DCIS, solid cribriform and flat types with intermediate nuclear grade and focal microcalcifications.  DCIS shows lobular extensions.  LCIS, classic type. Proliferative fibrocystic change with micro calcifications. All final margins are not involved by invasive carcinoma, distance from closest margin in > 5 mm.  Margin uninvolved by DCIS, distance of nearest margin [inferior margin] =  1.5 mm.  Three lymph nodes, negative for carcinoma (0/3).\par \par Oncotype Dx recurrence score 20, distant recurrence risk at 9 years 6%, CT benefit by age and RS result ~1.6%\par \par She met with Dr. Pickard and is planning to start radiation therapy to bilateral breast in near future. She is doing well currently without concerns.\par \par \par Pertient PMH:\par Asthma, essential tremor - extensive workup with neurology negative, has had it since her 20s, maybe slightly worse over time \par Appendectomy 21 years ago\par COVID vaccine done - Pfizer, 2nd dose in January\par PMD: Dr. Juan Pablo Yanez - Oct 2020, labs ok normal\par Colonoscopy 2019 - 5 years next\par Gyn 2019 - minor vaginal spotting - lining thickened - D&C normal. LMP age 52. Used OCPs in the past\par She has 2 children - son 21, daughter 18. 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 She is here with her  Joselin.  [de-identified] : Left IDC, DCIS; Right DCIS [de-identified] : Left: ER/SD+, HER2-; Right: ER/SD+ [de-identified] : Oncotype Dx recurrence score 20, distant recurrence risk at 9 years 6%, CT benefit by age and RS result ~1.6% [de-identified] : 9/21/21\par Patient completed radiation therapy b/l on June 14, 2021 - no complications/issues related to RT\par Anastrozole started 6/21/21\par Patient c/o night sweats, hotflashes, insomnia, hair thinning and low grade daily headaches.  H/o headaches but feels they may be more frequent\par since starting anastrozole.  At times will take tylenol, but not often.  Has tried changing time of day she takes meds.\par Patient denies any SOB, CP, abdominal pain, bone pain. \par Scheduled to see Dr. Matias tomorrow.\par \par Colonoscopy: 2019 5 year f/up \par Bone Density: 6.21 - normal bone mass\par Mammo/Sono: Per Dr. Matias schedule\par \par \par

## 2021-10-27 ENCOUNTER — OUTPATIENT (OUTPATIENT)
Dept: OUTPATIENT SERVICES | Facility: HOSPITAL | Age: 61
LOS: 1 days | Discharge: HOME | End: 2021-10-27

## 2021-10-27 DIAGNOSIS — C50.919 MALIGNANT NEOPLASM OF UNSPECIFIED SITE OF UNSPECIFIED FEMALE BREAST: ICD-10-CM

## 2021-10-28 ENCOUNTER — APPOINTMENT (OUTPATIENT)
Dept: HEMATOLOGY ONCOLOGY | Facility: CLINIC | Age: 61
End: 2021-10-28

## 2021-11-18 ENCOUNTER — FORM ENCOUNTER (OUTPATIENT)
Age: 61
End: 2021-11-18

## 2022-01-06 ENCOUNTER — OUTPATIENT (OUTPATIENT)
Dept: OUTPATIENT SERVICES | Facility: HOSPITAL | Age: 62
LOS: 1 days | Discharge: HOME | End: 2022-01-06

## 2022-01-06 DIAGNOSIS — C50.919 MALIGNANT NEOPLASM OF UNSPECIFIED SITE OF UNSPECIFIED FEMALE BREAST: ICD-10-CM

## 2022-01-07 ENCOUNTER — APPOINTMENT (OUTPATIENT)
Dept: HEMATOLOGY ONCOLOGY | Facility: CLINIC | Age: 62
End: 2022-01-07
Payer: COMMERCIAL

## 2022-01-07 VITALS
DIASTOLIC BLOOD PRESSURE: 76 MMHG | BODY MASS INDEX: 31.81 KG/M2 | OXYGEN SATURATION: 97 % | RESPIRATION RATE: 16 BRPM | TEMPERATURE: 98.1 F | HEART RATE: 101 BPM | WEIGHT: 190.92 LBS | SYSTOLIC BLOOD PRESSURE: 115 MMHG | HEIGHT: 64.96 IN

## 2022-01-07 DIAGNOSIS — T45.1X5A FLUSHING: ICD-10-CM

## 2022-01-07 DIAGNOSIS — R23.2 FLUSHING: ICD-10-CM

## 2022-01-07 PROCEDURE — 99214 OFFICE O/P EST MOD 30 MIN: CPT

## 2022-01-07 NOTE — REVIEW OF SYSTEMS
[Night Sweats] : night sweats [Negative] : Allergic/Immunologic [FreeTextEntry2] : hotflashes, insomnia [de-identified] : headaches [de-identified] : hair thinning

## 2022-01-07 NOTE — PHYSICAL EXAM
[Fully active, able to carry on all pre-disease performance without restriction] : Status 0 - Fully active, able to carry on all pre-disease performance without restriction [Normal] : affect appropriate [de-identified] : b/l lumpectomies, minimal hyperpigmentation secondary to RT , no palpable masses or adenopathy b/l

## 2022-01-07 NOTE — HISTORY OF PRESENT ILLNESS
[Disease: _____________________] : Disease: [unfilled] [T: ___] : T[unfilled] [N: ___] : N[unfilled] [AJCC Stage: ____] : AJCC Stage: [unfilled] [de-identified] : 61 y/o female who presents for initial breast medical oncology consultation.\par \par The patient has history of right excisional biopsy for LCIS on R in 2013.  \par \par Was having alternating mammograms/US and MRI - delayed due to COVID.\par \par The patient had routine mammogram and sonogram 1/18/21 followed by additional imaging 2/23/21 that showed at 6:00, 8 cm from the nipple a 7 x 5 x 5 mm hypoechoic mass with irregular margins, and containing some microcalcifications. Ultrasound-guided biopsy was recommended.\par \par Left breast ultrasound-guided biopsy was done 3/4/21.  Pathology came back as invasive well differentiated ductal carcinoma.  [place] score 5/9 (2 + 2 + 1) in this limited material. Invasive tumor measures at least 0.6 cm on the slide.  Rare isolated microcalcifications present in the tumor.   Atypical duct epithelial hyperplasia in one duct. ER positive (90%), MN positive (90%), HER2 negative.\par \par She was sent for breast MRI,  3/16/21 that showed biopsy-proven malignancy in the lower central left breast, measuring 1.2 x 0.9 x 0.9 cm.  7 mm enhancing mass in the lower central right breast. MRI guided biopsy is recommended.  A 4 mm enhancing dermal lesion in the lower outer left breast. Direct inspection is recommended.  BIRADS 4B\par \par MRI-guided biopsy right breast was done 3/18/21 and revealed a complex atypical papillary lesion.\par \par The patient underwent a bilateral lumpectomy and left SLNB with Dr. Matias on 3/30/21.  \par \par Final pathology of right lumpectomy: DCIS, cribriform and micropapillary types with low nuclear grade. ER positive (>98%), MN positive (90%).  \par \par Left lumpectomy with SLNB final pathology: IDC, moderately differentiated (size: 1.1 cm).  DCIS, solid cribriform and flat types with intermediate nuclear grade and focal microcalcifications.  DCIS shows lobular extensions.  LCIS, classic type. Proliferative fibrocystic change with micro calcifications. All final margins are not involved by invasive carcinoma, distance from closest margin in > 5 mm.  Margin uninvolved by DCIS, distance of nearest margin [inferior margin] =  1.5 mm.  Three lymph nodes, negative for carcinoma (0/3).\par \par Oncotype Dx recurrence score 20, distant recurrence risk at 9 years 6%, CT benefit by age and RS result ~1.6%\par \par She met with Dr. Pickard and completed radiation therapy b/l on June 14, 2021 - no complications/issues related to RT [de-identified] : Left IDC, DCIS; Right DCIS [de-identified] : Left: ER/ND+, HER2-; Right: ER/ND+ [de-identified] : Oncotype Dx recurrence score 20, distant recurrence risk at 9 years 6%, CT benefit by age and RS result ~1.6% [de-identified] : \par Anastrozole started 6/21/21, hot flashes improved with relizen, joint pains-just started osteoflex for diffuse joint pains, will try tumeric if not helping, joint pains not interfering with ADLs, hair thinning prior to anastrazole but worsened on anastrazole, feels biotin is not helping much. \par Mammo/Sono sched for 1/2021 pending end of the month\par Saw Dr. Matias 9/22/21, exam stable\par Labs done 9/21/21\par \par HEALTH MAINTENANCE \par PCP Dr. Juan Pablo Marx, last seen 6/7/21\par Bone Density: 6.21 - normal bone mass\par NEURO essential tremor - extensive workup with neurology negative, has had it since her 20s, maybe slightly worse over time \par GI Appendectomy 21 years ago, Colonoscopy 2019 - 5 years next\par COVID vaccine done - Pfizer, 2nd dose in January\par PMD: Dr. Juan Pablo Yanez - Oct 2021, labs ok normal\par Gyn 2019 - minor vaginal spotting - lining thickened - D&C normal. LMP age 52. Used OCPs in the past\par She has 2 children - son 21, daughter 18. 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

## 2022-01-07 NOTE — ASSESSMENT
[Curative] : Goals of care discussed with patient: Curative [FreeTextEntry1] : 62 y/o post-menopausal female diagnosed with left breast stage IA ER/VT+, HER2- cancer as well as right breast DCIS diagnosed 3/2021.  She is s/p bilateral lumpectomy and left SLNB.  Oncotype Dx recurrence score 20 (~1.6 CT benefit).  s/p RT.  Here for follow up.\par \par -Started endocrine therapy anastrozole 6/21/21, overall tolerating well.\par -clinically ANNY \par -mammo/US this month\par -continue to follow up with Dr. Matias\par -recent labs reviewed\par -FU in 4mos or sooner\par \par \par

## 2022-01-20 ENCOUNTER — APPOINTMENT (OUTPATIENT)
Dept: RADIATION ONCOLOGY | Facility: CLINIC | Age: 62
End: 2022-01-20
Payer: COMMERCIAL

## 2022-01-20 ENCOUNTER — NON-APPOINTMENT (OUTPATIENT)
Age: 62
End: 2022-01-20

## 2022-01-20 VITALS
BODY MASS INDEX: 32.1 KG/M2 | HEART RATE: 68 BPM | OXYGEN SATURATION: 96 % | DIASTOLIC BLOOD PRESSURE: 75 MMHG | HEIGHT: 64 IN | SYSTOLIC BLOOD PRESSURE: 120 MMHG | RESPIRATION RATE: 18 BRPM | WEIGHT: 188 LBS

## 2022-01-20 PROCEDURE — 99213 OFFICE O/P EST LOW 20 MIN: CPT

## 2022-01-20 NOTE — PHYSICAL EXAM
[Symmetric] : breasts are symmetric [Breast Abnormal Lactation (Galactorrhea)] : no nipple discharge [No UE Edema] : there is no upper extremity edema [Normal] : oriented to person, place and time, the affect was normal, the mood was normal and not anxious [de-identified] : faint hyperpigmentation bilaterally

## 2022-01-20 NOTE — HISTORY OF PRESENT ILLNESS
[FreeTextEntry1] : The patient is a pleasant 61 year old female diagnosed with left breast invasive ductal carcinoma and right breast DCIS. She has a history of a right excisional biopsy in 2013 for LCIS with Dr. Matias. She was having regular breast imaging and followed up with Dr. Matias regularly but she missed 2020 mammo due to COVID-19 pandemic. Her recent screening mammo/sono on 1/24/21 showed a left breast mass and right breast asymmetry. Nodule at 6:00 in left breast for which targeted diagnostic ultrasound was recommended. Diagnostic mammo/sono done on 2/23/21 revealed changes suspicious for malignancy in the left breast at 6:00, 8 cm from the nipple. Ultrasound-guided biopsy on 3/4/21 showed left breast at 6:00 an Invasive well  differentiated ductal carcinoma, Page score 5/9 (2 + 2 + 1) measuring at least 0.6 cm, rare isolated microcalcifications present in the tumor, atypical duct epithelial hyperplasia in one duct ER/NH 90% positive, HER-2 Negative. Breast MRI also revealed a right breast abnormality and an MRI guided core biopsy on 3/18/21 showed a complex atypical papillary lesion in right, inferior central to medial breast. On 3/30 21 she underwent bilateral partial mastectomies and surgical pathology revealed RIGHT breast ductal carcinoma in Situ in 1/12 blocks, measuring 5 mm, cribriform and micropapillary type, low nuclear grade, margins negative with focal margin <1 mm. Lymph nodes not examined. LEFT breast pathology showed a moderately differentiated invasive ductal carcinoma SBR 6/9 measuring 1.1 cm, unifocal with DCIS present, solid, cribriform and flat type, grade intermediate. LCIS identified. Margins for invasive tumor negative and  > 5mm. No LVI and margins for DCIS negative, 1.5mm. 0/3 SLN involved. ER, NH 90% positive, HER2 Negative. Received COVID vaccine. Oncotype DX and Genetic panel testing pending. She presents today to discuss the role of radiation therapy in her care. She completed a dose of 4240 cGy, plus 1,000 cGy boost, to the left and right breast on 6/14/21.\par \par She presents for 7 months follow up. Feeling well, has been on Anastrozole x 7 months, hot flashes have improved with Relizen. She reports diffuse joint pains which started recently, she is on Osteoflex and Turmeric. Reports hair thinning. Mammo/sono scheduled for sure. 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

## 2022-01-25 ENCOUNTER — APPOINTMENT (OUTPATIENT)
Dept: ULTRASOUND IMAGING | Facility: CLINIC | Age: 62
End: 2022-01-25
Payer: COMMERCIAL

## 2022-01-25 ENCOUNTER — OUTPATIENT (OUTPATIENT)
Dept: OUTPATIENT SERVICES | Facility: HOSPITAL | Age: 62
LOS: 1 days | End: 2022-01-25
Payer: COMMERCIAL

## 2022-01-25 ENCOUNTER — APPOINTMENT (OUTPATIENT)
Dept: MAMMOGRAPHY | Facility: CLINIC | Age: 62
End: 2022-01-25
Payer: COMMERCIAL

## 2022-01-25 DIAGNOSIS — Z00.8 ENCOUNTER FOR OTHER GENERAL EXAMINATION: ICD-10-CM

## 2022-01-25 PROCEDURE — 77066 DX MAMMO INCL CAD BI: CPT | Mod: 26

## 2022-01-25 PROCEDURE — 76641 ULTRASOUND BREAST COMPLETE: CPT | Mod: 26,50

## 2022-01-25 PROCEDURE — G0279: CPT

## 2022-01-25 PROCEDURE — 77066 DX MAMMO INCL CAD BI: CPT

## 2022-01-25 PROCEDURE — G0279: CPT | Mod: 26

## 2022-01-25 PROCEDURE — 76641 ULTRASOUND BREAST COMPLETE: CPT

## 2022-03-30 ENCOUNTER — TRANSCRIPTION ENCOUNTER (OUTPATIENT)
Age: 62
End: 2022-03-30

## 2022-03-31 ENCOUNTER — TRANSCRIPTION ENCOUNTER (OUTPATIENT)
Age: 62
End: 2022-03-31

## 2022-05-05 ENCOUNTER — OUTPATIENT (OUTPATIENT)
Dept: OUTPATIENT SERVICES | Facility: HOSPITAL | Age: 62
LOS: 1 days | Discharge: HOME | End: 2022-05-05

## 2022-05-05 DIAGNOSIS — C50.919 MALIGNANT NEOPLASM OF UNSPECIFIED SITE OF UNSPECIFIED FEMALE BREAST: ICD-10-CM

## 2022-05-10 ENCOUNTER — APPOINTMENT (OUTPATIENT)
Dept: HEMATOLOGY ONCOLOGY | Facility: CLINIC | Age: 62
End: 2022-05-10
Payer: COMMERCIAL

## 2022-05-10 VITALS
TEMPERATURE: 97.6 F | WEIGHT: 192.99 LBS | DIASTOLIC BLOOD PRESSURE: 79 MMHG | SYSTOLIC BLOOD PRESSURE: 116 MMHG | OXYGEN SATURATION: 97 % | HEIGHT: 63.98 IN | RESPIRATION RATE: 16 BRPM | BODY MASS INDEX: 32.95 KG/M2 | HEART RATE: 70 BPM

## 2022-05-10 PROCEDURE — 99214 OFFICE O/P EST MOD 30 MIN: CPT

## 2022-05-10 NOTE — PHYSICAL EXAM
[Fully active, able to carry on all pre-disease performance without restriction] : Status 0 - Fully active, able to carry on all pre-disease performance without restriction [Normal] : affect appropriate [de-identified] : b/l lumpectomies, minimal hyperpigmentation secondary to RT , no palpable masses or adenopathy b/l

## 2022-05-10 NOTE — REVIEW OF SYSTEMS
[Night Sweats] : night sweats [Negative] : Allergic/Immunologic [FreeTextEntry2] : hotflashes, insomnia [de-identified] : headaches [de-identified] : hair thinning

## 2022-05-10 NOTE — ASSESSMENT
[Curative] : Goals of care discussed with patient: Curative [FreeTextEntry1] : 60 y/o post-menopausal female diagnosed with left breast stage IA ER/SD+, HER2- cancer as well as right breast DCIS diagnosed 3/2021.  She is s/p bilateral lumpectomy and left SLNB.  Oncotype Dx recurrence score 20 (~1.6 CT benefit).  s/p RT.  Here for follow up.\par \par -Started endocrine therapy anastrozole 6/21/21, feels her joint pains are getting worse.\par -will trial off of anastrazole x 2 weeks, if better, will switch to exemestane.\par -clinically ANNY \par -mammo/US up to date\par -breast MRI in 7/2022\par -continue to follow up with Dr. Matias as recommended\par -labs today\par -FU in 2 weeks via TEB\par \par \par

## 2022-05-10 NOTE — HISTORY OF PRESENT ILLNESS
[Disease: _____________________] : Disease: [unfilled] [T: ___] : T[unfilled] [N: ___] : N[unfilled] [AJCC Stage: ____] : AJCC Stage: [unfilled] [de-identified] : The patient has history of right excisional biopsy for LCIS on R in 2013.  \par \par Was having alternating mammograms/US and MRI - delayed due to COVID.\par \par The patient had routine mammogram and sonogram 1/18/21 followed by additional imaging 2/23/21 that showed at 6:00, 8 cm from the nipple a 7 x 5 x 5 mm hypoechoic mass with irregular margins, and containing some microcalcifications. Ultrasound-guided biopsy was recommended.\par \par Left breast ultrasound-guided biopsy was done 3/4/21.  Pathology came back as invasive well differentiated ductal carcinoma.  Page score 5/9 (2 + 2 + 1) in this limited material. Invasive tumor measures at least 0.6 cm on the slide.  Rare isolated microcalcifications present in the tumor.   Atypical duct epithelial hyperplasia in one duct. ER positive (90%), CA positive (90%), HER2 negative.\par \par She was sent for breast MRI,  3/16/21 that showed biopsy-proven malignancy in the lower central left breast, measuring 1.2 x 0.9 x 0.9 cm.  7 mm enhancing mass in the lower central right breast. MRI guided biopsy is recommended.  A 4 mm enhancing dermal lesion in the lower outer left breast. Direct inspection is recommended.  BIRADS 4B\par \par MRI-guided biopsy right breast was done 3/18/21 and revealed a complex atypical papillary lesion.\par \par The patient underwent a bilateral lumpectomy and left SLNB with Dr. Matias on 3/30/21.  \par \par Final pathology of right lumpectomy: DCIS, cribriform and micropapillary types with low nuclear grade. ER positive (>98%), CA positive (90%).  \par \par Left lumpectomy with SLNB final pathology: IDC, moderately differentiated (size: 1.1 cm).  DCIS, solid cribriform and flat types with intermediate nuclear grade and focal microcalcifications.  DCIS shows lobular extensions.  LCIS, classic type. Proliferative fibrocystic change with micro calcifications. All final margins are not involved by invasive carcinoma, distance from closest margin in > 5 mm.  Margin uninvolved by DCIS, distance of nearest margin [inferior margin] =  1.5 mm.  Three lymph nodes, negative for carcinoma (0/3).\par \par Oncotype Dx recurrence score 20, distant recurrence risk at 9 years 6%, CT benefit by age and RS result ~1.6%\par \par She met with Dr. Pickard and completed radiation therapy b/l on June 14, 2021 - no complications/issues related to RT [de-identified] : Left IDC, DCIS; Right DCIS [de-identified] : Left: ER/TN+, HER2-; Right: ER/TN+ [de-identified] : Oncotype Dx recurrence score 20, distant recurrence risk at 9 years 6%, CT benefit by age and RS result ~1.6% [de-identified] : \par Anastrozole started 6/21/21, hot flashes improved with relizen, diffuse joint pains mostly in knees and elbows, feels it is getting worse, still not interfering with ADLs, \par Intermittent headaches, not worsening; hair thinning on biotin and nutrafol, mildly helping\par Mammo/Sono 1/25/22, BIRADS2, FU in 1 year\par MRI breast due 7/2022\par Saw Dr. Matias 9/22/21, exam stable, FU yearly\par Labs done 9/21/21\par \par HEALTH MAINTENANCE \par PCP Dr. Juan Pablo Marx, last seen 6/7/21\par Bone Density: 6.21 - normal bone mass\par NEURO essential tremor - extensive workup with neurology negative, has had it since her 20s, maybe slightly worse over time \par GI Appendectomy 21 years ago, Colonoscopy 2019 - 5 years next\par COVID vaccine done - Pfizer, 2nd dose in January\par Gyn 2019 - minor vaginal spotting - lining thickened - D&C normal. LMP age 52. Used OCPs in the past\par She has 2 children - son 21, daughter 18. 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 \par s/p COVID vaccine, booster x 1 left arm

## 2022-05-26 ENCOUNTER — APPOINTMENT (OUTPATIENT)
Dept: HEMATOLOGY ONCOLOGY | Facility: CLINIC | Age: 62
End: 2022-05-26
Payer: COMMERCIAL

## 2022-05-26 PROCEDURE — 99214 OFFICE O/P EST MOD 30 MIN: CPT | Mod: 95

## 2022-05-26 RX ORDER — ANASTROZOLE TABLETS 1 MG/1
1 TABLET ORAL
Qty: 90 | Refills: 1 | Status: DISCONTINUED | COMMUNITY
Start: 2021-04-27 | End: 2022-05-26

## 2022-06-16 NOTE — HISTORY OF PRESENT ILLNESS
[Home] : at home, [unfilled] , at the time of the visit. [Medical Office: ([facility])___] : at the medical office located in  [Verbal consent obtained from patient] : the patient, [unfilled] [Disease: _____________________] : Disease: [unfilled] [T: ___] : T[unfilled] [N: ___] : N[unfilled] [AJCC Stage: ____] : AJCC Stage: [unfilled] [de-identified] : The patient has history of right excisional biopsy for LCIS on R in 2013.  \par \par Was having alternating mammograms/US and MRI - delayed due to COVID.\par \par The patient had routine mammogram and sonogram 1/18/21 followed by additional imaging 2/23/21 that showed at 6:00, 8 cm from the nipple a 7 x 5 x 5 mm hypoechoic mass with irregular margins, and containing some microcalcifications. Ultrasound-guided biopsy was recommended.\par \par Left breast ultrasound-guided biopsy was done 3/4/21.  Pathology came back as invasive well differentiated ductal carcinoma.  Page score 5/9 (2 + 2 + 1) in this limited material. Invasive tumor measures at least 0.6 cm on the slide.  Rare isolated microcalcifications present in the tumor.   Atypical duct epithelial hyperplasia in one duct. ER positive (90%), SC positive (90%), HER2 negative.\par \par She was sent for breast MRI,  3/16/21 that showed biopsy-proven malignancy in the lower central left breast, measuring 1.2 x 0.9 x 0.9 cm.  7 mm enhancing mass in the lower central right breast. MRI guided biopsy is recommended.  A 4 mm enhancing dermal lesion in the lower outer left breast. Direct inspection is recommended.  BIRADS 4B\par \par MRI-guided biopsy right breast was done 3/18/21 and revealed a complex atypical papillary lesion.\par \par The patient underwent a bilateral lumpectomy and left SLNB with Dr. Matias on 3/30/21.  \par \par Final pathology of right lumpectomy: DCIS, cribriform and micropapillary types with low nuclear grade. ER positive (>98%), SC positive (90%).  \par \par Left lumpectomy with SLNB final pathology: IDC, moderately differentiated (size: 1.1 cm).  DCIS, solid cribriform and flat types with intermediate nuclear grade and focal microcalcifications.  DCIS shows lobular extensions.  LCIS, classic type. Proliferative fibrocystic change with micro calcifications. All final margins are not involved by invasive carcinoma, distance from closest margin in > 5 mm.  Margin uninvolved by DCIS, distance of nearest margin [inferior margin] =  1.5 mm.  Three lymph nodes, negative for carcinoma (0/3).\par \par Oncotype Dx recurrence score 20, distant recurrence risk at 9 years 6%, CT benefit by age and RS result ~1.6%\par \par She met with Dr. Pickard and completed radiation therapy b/l on June 14, 2021 - no complications/issues related to RT [de-identified] : Left IDC, DCIS; Right DCIS [de-identified] : Left: ER/OH+, HER2-; Right: ER/OH+ [de-identified] : Oncotype Dx recurrence score 20, distant recurrence risk at 9 years 6%, CT benefit by age and RS result ~1.6% [de-identified] : \par Anastrozole started 6/21/21\par diffuse joint pains mostly in knees and elbows, feels it is getting worse\par Intermittent headaches, not worsening; hair thinning on biotin and nutrafol, mildly helping\par Pt is here to follow up after 2 week drug holiday. Joint pains overall improved. Wants to try alternative.\par Mammo/Sono 1/25/22, BIRADS2, FU in 1 year\par MRI breast due 7/2022\par Saw Dr. Matias 9/22/21, exam stable, FU yearly\par Labs done 9/21/21\par \par HEALTH MAINTENANCE \par PCP Dr. Juan Pablo Marx, last seen 6/7/21\par Bone Density: 6.21 - normal bone mass\par NEURO essential tremor - extensive workup with neurology negative, has had it since her 20s, maybe slightly worse over time \par GI Appendectomy 21 years ago, Colonoscopy 2019 - 5 years next\par COVID vaccine done - Pfizer, 2nd dose in January\par Gyn 2019 - minor vaginal spotting - lining thickened - D&C normal. LMP age 52. Used OCPs in the past\par She has 2 children - son 21, daughter 18. 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 \par s/p COVID vaccine, booster x 1 left arm

## 2022-06-16 NOTE — ASSESSMENT
[Curative] : Goals of care discussed with patient: Curative [FreeTextEntry1] : 63 y/o post-menopausal female diagnosed with left breast stage IA ER/AK+, HER2- cancer as well as right breast DCIS diagnosed 3/2021.  She is s/p bilateral lumpectomy and left SLNB.  Oncotype Dx recurrence score 20 (~1.6 CT benefit).  s/p RT.  Here for follow up.\par \par -Started anastrozole 6/21/21, feels her joint pains are getting worse. On hold for 2 weeks with improvement in symptoms.\par -will trial exemestane, rx sent to pharmacy.\par -clinically ANNY \par -mammo/US up to date\par -breast MRI in 7/2022\par -continue to follow up with Dr. Matias as recommended\par -labs today\par -FU in 1 month\par \par \par

## 2022-06-16 NOTE — REVIEW OF SYSTEMS
[Night Sweats] : night sweats [Negative] : Allergic/Immunologic [FreeTextEntry2] : hotflashes, insomnia [de-identified] : headaches [de-identified] : hair thinning

## 2022-06-20 ENCOUNTER — OUTPATIENT (OUTPATIENT)
Dept: OUTPATIENT SERVICES | Facility: HOSPITAL | Age: 62
LOS: 1 days | Discharge: HOME | End: 2022-06-20

## 2022-06-20 DIAGNOSIS — C50.919 MALIGNANT NEOPLASM OF UNSPECIFIED SITE OF UNSPECIFIED FEMALE BREAST: ICD-10-CM

## 2022-06-24 ENCOUNTER — APPOINTMENT (OUTPATIENT)
Dept: HEMATOLOGY ONCOLOGY | Facility: CLINIC | Age: 62
End: 2022-06-24
Payer: COMMERCIAL

## 2022-06-24 PROCEDURE — 99214 OFFICE O/P EST MOD 30 MIN: CPT | Mod: 95

## 2022-06-24 NOTE — ASSESSMENT
[Curative] : Goals of care discussed with patient: Curative [FreeTextEntry1] : 61 y/o post-menopausal female diagnosed with left breast stage IA ER/AK+, HER2- cancer as well as right breast DCIS diagnosed 3/2021.  She is s/p bilateral lumpectomy and left SLNB.  Oncotype Dx recurrence score 20 (~1.6 CT benefit).  s/p RT.  Here for follow up.\par \par -Started anastrozole 6/21/21, c/b intolerable joint pains.  Now on exemestane since 5/26/22 and doing better.\par -symptoms not suggestive of recurrence\par -mammo/US up to date\par -breast MRI in 7/2022\par -continue to follow up with Dr. Matias as recommended\par -labs next visit\par -FU in 2 months for clinical breast exam\par \par \par

## 2022-06-24 NOTE — HISTORY OF PRESENT ILLNESS
[Disease: _____________________] : Disease: [unfilled] [T: ___] : T[unfilled] [N: ___] : N[unfilled] [AJCC Stage: ____] : AJCC Stage: [unfilled] [de-identified] : The patient has history of right excisional biopsy for LCIS on R in 2013.  \par \par Was having alternating mammograms/US and MRI - delayed due to COVID.\par \par The patient had routine mammogram and sonogram 1/18/21 followed by additional imaging 2/23/21 that showed at 6:00, 8 cm from the nipple a 7 x 5 x 5 mm hypoechoic mass with irregular margins, and containing some microcalcifications. Ultrasound-guided biopsy was recommended.\par \par Left breast ultrasound-guided biopsy was done 3/4/21.  Pathology came back as invasive well differentiated ductal carcinoma.  Page score 5/9 (2 + 2 + 1) in this limited material. Invasive tumor measures at least 0.6 cm on the slide.  Rare isolated microcalcifications present in the tumor.   Atypical duct epithelial hyperplasia in one duct. ER positive (90%), CO positive (90%), HER2 negative.\par \par She was sent for breast MRI,  3/16/21 that showed biopsy-proven malignancy in the lower central left breast, measuring 1.2 x 0.9 x 0.9 cm.  7 mm enhancing mass in the lower central right breast. MRI guided biopsy is recommended.  A 4 mm enhancing dermal lesion in the lower outer left breast. Direct inspection is recommended.  BIRADS 4B\par \par MRI-guided biopsy right breast was done 3/18/21 and revealed a complex atypical papillary lesion.\par \par The patient underwent a bilateral lumpectomy and left SLNB with Dr. Matias on 3/30/21.  \par \par Final pathology of right lumpectomy: DCIS, cribriform and micropapillary types with low nuclear grade. ER positive (>98%), CO positive (90%).  \par \par Left lumpectomy with SLNB final pathology: IDC, moderately differentiated (size: 1.1 cm).  DCIS, solid cribriform and flat types with intermediate nuclear grade and focal microcalcifications.  DCIS shows lobular extensions.  LCIS, classic type. Proliferative fibrocystic change with micro calcifications. All final margins are not involved by invasive carcinoma, distance from closest margin in > 5 mm.  Margin uninvolved by DCIS, distance of nearest margin [inferior margin] =  1.5 mm.  Three lymph nodes, negative for carcinoma (0/3).\par \par Oncotype Dx recurrence score 20, distant recurrence risk at 9 years 6%, CT benefit by age and RS result ~1.6%\par \par She met with Dr. Pickard and completed radiation therapy b/l on June 14, 2021 - no complications/issues related to RT\par \par Anastrozole started 6/21/21 [de-identified] : Left IDC, DCIS; Right DCIS [de-identified] : Left: ER/UT+, HER2-; Right: ER/UT+ [de-identified] : Oncotype Dx recurrence score 20, distant recurrence risk at 9 years 6%, CT benefit by age and RS result ~1.6% [de-identified] : \par Anastrozole started 6/21/21 c/b diffuse joint pains; switched to exemestane in 5/26/22\par Tolerating better, joint pains tolerable, on tumeric and osteoflex.  Hot flashes controlled with relizen.\par Intermittent headaches couple times/week, completely resolved with drug holiday but now back with endocrine therapy.\par Mammo/[facility] 1/25/22, BIRADS2, FU in 1 year\par MRI breast due 7/2022\par Saw Dr. Matias 9/22/21, exam stable, FU yearly\par Labs today\par \par HEALTH MAINTENANCE \par PCP Dr. Juan Pablo Marx, last seen 6/7/21\par Bone Density: 6.21 - normal bone mass\par NEURO essential tremor - extensive workup with neurology negative, has had it since her 20s, maybe slightly worse over time \par GI Appendectomy 21 years ago, Colonoscopy 2019 - 5 years next\par COVID vaccine done - Pfizer, 2nd dose in January\par Gyn 2019 - minor vaginal spotting - lining thickened - D&C normal. LMP age 52. Used OCPs in the past\par She has 2 children - son 21, daughter 18. 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 \par s/p COVID vaccine, booster x 1 left arm [Home] : at home, [unfilled] , at the time of the visit. [Medical Office: ([facility])___] : at the medical office located in  [Verbal consent obtained from patient] : the patient, [unfilled]

## 2022-06-24 NOTE — REVIEW OF SYSTEMS
[Night Sweats] : night sweats [Negative] : Allergic/Immunologic [FreeTextEntry2] : hotflashes, insomnia [de-identified] : headaches [de-identified] : hair thinning

## 2022-07-08 ENCOUNTER — OUTPATIENT (OUTPATIENT)
Dept: OUTPATIENT SERVICES | Facility: HOSPITAL | Age: 62
LOS: 1 days | End: 2022-07-08
Payer: COMMERCIAL

## 2022-07-08 ENCOUNTER — APPOINTMENT (OUTPATIENT)
Dept: MRI IMAGING | Facility: CLINIC | Age: 62
End: 2022-07-08

## 2022-07-08 DIAGNOSIS — Z00.8 ENCOUNTER FOR OTHER GENERAL EXAMINATION: ICD-10-CM

## 2022-07-08 PROCEDURE — C8937: CPT

## 2022-07-08 PROCEDURE — C8908: CPT

## 2022-07-08 PROCEDURE — A9585: CPT

## 2022-07-08 PROCEDURE — 77049 MRI BREAST C-+ W/CAD BI: CPT | Mod: 26

## 2022-07-31 ENCOUNTER — RESULT CHARGE (OUTPATIENT)
Age: 62
End: 2022-07-31

## 2022-08-02 ENCOUNTER — RESULT REVIEW (OUTPATIENT)
Age: 62
End: 2022-08-02

## 2022-08-02 ENCOUNTER — APPOINTMENT (OUTPATIENT)
Dept: INTERNAL MEDICINE | Facility: CLINIC | Age: 62
End: 2022-08-02

## 2022-08-02 ENCOUNTER — NON-APPOINTMENT (OUTPATIENT)
Age: 62
End: 2022-08-02

## 2022-08-02 VITALS
DIASTOLIC BLOOD PRESSURE: 62 MMHG | SYSTOLIC BLOOD PRESSURE: 106 MMHG | BODY MASS INDEX: 33.12 KG/M2 | RESPIRATION RATE: 16 BRPM | HEIGHT: 63.98 IN | TEMPERATURE: 98.1 F | WEIGHT: 194 LBS | OXYGEN SATURATION: 98 % | HEART RATE: 75 BPM

## 2022-08-02 DIAGNOSIS — Z01.84 ENCOUNTER FOR ANTIBODY RESPONSE EXAMINATION: ICD-10-CM

## 2022-08-02 PROCEDURE — G0444 DEPRESSION SCREEN ANNUAL: CPT | Mod: 59

## 2022-08-02 PROCEDURE — 93000 ELECTROCARDIOGRAM COMPLETE: CPT | Mod: 59

## 2022-08-02 PROCEDURE — 99396 PREV VISIT EST AGE 40-64: CPT | Mod: 25

## 2022-08-02 PROCEDURE — 36415 COLL VENOUS BLD VENIPUNCTURE: CPT

## 2022-08-02 PROCEDURE — 99213 OFFICE O/P EST LOW 20 MIN: CPT | Mod: 25

## 2022-08-02 NOTE — ASSESSMENT
[FreeTextEntry1] : Patient is a 62 year old female with a past medical history as above who presents for an annual wellness visit.

## 2022-08-02 NOTE — HISTORY OF PRESENT ILLNESS
[FreeTextEntry1] : annual wellness visit [de-identified] : Patient is a 62 year old female with a past medical history as below who presents for an annual wellness visit. Patient has seen GYN, Dr. Wallis in the past year for her annual visit. Her last breast imaging was in 2022 (Mammogram/Breast US in January 2022, Breast MRI in July 2022). Her last bone density testing was in June 2021. Patient's last colonoscopy was in December 2019 with gastroenterologist, Dr. Duncan. Patient receives the flu vaccine annually. She has not received the Pneumonia Vaccine. She believes she has received the Tetanus Vaccine in the past 10 years. She has not received the Shingles (Shingrix) Vaccine, but does not believe she had chickenpox as a child. She is fully vaccinated against COVID-19.\par \par Patient is currently seeing oncologist, Dr. Segura given history of breast cancer. Patient is s/p excisional biopsy R LCIS in 2013. Imaging dated 2/23/21 revealed at 6:00, 8 cm from the nipple a 7 x 5 x 5 mm hypoechoic mass with irregular margins and containing some microcalcifications. Left breast ultrasound-guided biopsy was done 3/4/21; pathology came back as invasive well differentiated ductal carcinoma. Breast MRI dated 3/16/21 revealed biopsy-proven malignancy in the lower central left breast. MRI-guided biopsy right breast dated 3/18/21 revealed complex atypical papillary lesion; s/p bilateral lumpectomy and left SLNB on 3/30/21. She completed radiation therapy on 6/4/21. She was initially started on Anastrozole; discontinued secondary to side effects; now on Exemestane. She has been taking Meloxicam/Advil as needed for arthralgias (both hips, both knees, and ankles) which began shortly after starting the aromatase inhibitors. She has been taking Glucosamine/Chondroitin daily.

## 2022-08-02 NOTE — HEALTH RISK ASSESSMENT
[Never] : Never [No] : In the past 12 months have you used drugs other than those required for medical reasons? No [0] : 2) Feeling down, depressed, or hopeless: Not at all (0) [PHQ-2 Negative - No further assessment needed] : PHQ-2 Negative - No further assessment needed [Audit-CScore] : 0 [IPS0Icbax] : 0 [MammogramComments] : Results of most recent breast imaging to be requested from [facility] in [place]. [BoneDensityDate] : 06/21 [ColonoscopyDate] : 12/19 [BoneDensityComments] : Normal bone mass.

## 2022-08-02 NOTE — PHYSICAL EXAM
[No Acute Distress] : no acute distress [Well Nourished] : well nourished [Well Developed] : well developed [Well-Appearing] : well-appearing [Normal Sclera/Conjunctiva] : normal sclera/conjunctiva [PERRL] : pupils equal round and reactive to light [EOMI] : extraocular movements intact [Normal Outer Ear/Nose] : the outer ears and nose were normal in appearance [Normal Oropharynx] : the oropharynx was normal [No JVD] : no jugular venous distention [No Lymphadenopathy] : no lymphadenopathy [Supple] : supple [Thyroid Normal, No Nodules] : the thyroid was normal and there were no nodules present [No Respiratory Distress] : no respiratory distress  [No Accessory Muscle Use] : no accessory muscle use [Clear to Auscultation] : lungs were clear to auscultation bilaterally [Normal Rate] : normal rate  [Regular Rhythm] : with a regular rhythm [Normal S1, S2] : normal S1 and S2 [No Murmur] : no murmur heard [No Carotid Bruits] : no carotid bruits [No Abdominal Bruit] : a ~M bruit was not heard ~T in the abdomen [No Varicosities] : no varicosities [Pedal Pulses Present] : the pedal pulses are present [No Edema] : there was no peripheral edema [No Palpable Aorta] : no palpable aorta [No Extremity Clubbing/Cyanosis] : no extremity clubbing/cyanosis [Soft] : abdomen soft [Non Tender] : non-tender [Non-distended] : non-distended [No Masses] : no abdominal mass palpated [No HSM] : no HSM [Normal Bowel Sounds] : normal bowel sounds [Normal Posterior Cervical Nodes] : no posterior cervical lymphadenopathy [Normal Anterior Cervical Nodes] : no anterior cervical lymphadenopathy [No CVA Tenderness] : no CVA  tenderness [No Spinal Tenderness] : no spinal tenderness [No Joint Swelling] : no joint swelling [Grossly Normal Strength/Tone] : grossly normal strength/tone [No Rash] : no rash [Coordination Grossly Intact] : coordination grossly intact [No Focal Deficits] : no focal deficits [Normal Gait] : normal gait [Deep Tendon Reflexes (DTR)] : deep tendon reflexes were 2+ and symmetric [Normal Affect] : the affect was normal [Normal Insight/Judgement] : insight and judgment were intact [Normal Voice/Communication] : normal voice/communication [Normal TMs] : both tympanic membranes were normal [Normal Supraclavicular Nodes] : no supraclavicular lymphadenopathy [Kyphosis] : no kyphosis [Scoliosis] : no scoliosis [Acne] : no acne [Speech Grossly Normal] : speech grossly normal [Memory Grossly Normal] : memory grossly normal [Alert and Oriented x3] : oriented to person, place, and time [Normal Mood] : the mood was normal [de-identified] : e [de-identified] : done by breast surgeon/GYN [de-identified] : obese [FreeTextEntry1] : done by GI

## 2022-08-02 NOTE — ADDENDUM
[FreeTextEntry1] : I, Torito Stoll, acted solely as scribe for Dr. Juan Pablo Marx DO on this date 08/02/2022 10:00AM .\par \par All medical record entries made by the Scribe were at my, Dr. Juan Pablo Marx DO direction and personally dictated by me on 08/02/2022 10:00AM. I have reviewed the chart and agree that the record accurately reflects my personal performance of the history, physical exam, assessment and plan. I have also personally directed, reviewed and agreed with the chart.

## 2022-08-02 NOTE — REVIEW OF SYSTEMS
[Joint Pain] : joint pain [Negative] : Heme/Lymph [FreeTextEntry9] : knee pain; hip pain; ankle pain

## 2022-08-02 NOTE — PLAN
[FreeTextEntry1] : Cardiology\par history of hyperlipidemia - continue low cholesterol/low fat diet - check FLP\par Endocrinology\par hyperglycemia - continue low carbohydrate/low sugar diet and CV exercise - check hemoglobin A1C\par history of obesity/overweight - continue low carbohydrate diet and CV exercise - check TSH\par history of Vitamin D deficiency - continue Vitamin D-3 p.o. with a meal as directed - check Vitamin D \par Gynecology\par Results of last pap smear to be requested from GYN, Dr. Wallis's office\par Oncology\par history of breast cancer - s/p excisional biopsy R LCIS in 2013; s/p bilateral lumpectomy and left SLNB on 3/30/21; s/p radiation therapy - continue Exemestane 25mg p.o.q.d. as directed - continue to follow up with oncologist, Dr. Segura and breast surgeon, Dr. Matias; Results of most recent breast imaging to be requested from [facility] in [place]\par Musculoskeletal\par bilateral knee pain - Rx given for X-Ray Knees, Bilateral \par arthralgias - continue Meloxicam 15mg p.o. p.r.n. with food as directed, Rx filled; advised against taking the medication with any other NSAID (Aleve, Advil, or Motrin) in the same 24-hour period; advised against taking the medication daily given increased risk for GI ulcers/bleeding and progressive damage to liver/kidneys \par Immunization\par Pneumovax 23 - previously discussed given history of asthma, patient to consider and follow up\par Shingrix - check Varicella Zoster IgG Antibody to determine if patient is eligible to receive the vaccine; if eligible, advised patient to determine if vaccine is covered under medical benefits of current insurance plan and follow up for 1st dose if interested; otherwise, instructed to follow up at the pharmacy for vaccine administration\par \par check EKG (results as above), female panel, hemoglobin A1C, vitamin panel, Varicella Zoster IgG Antibody, and UA w/ Reflex Urine Culture

## 2022-08-02 NOTE — PHYSICAL EXAM
[No Acute Distress] : no acute distress [Well Nourished] : well nourished [Well Developed] : well developed [Well-Appearing] : well-appearing [Normal Sclera/Conjunctiva] : normal sclera/conjunctiva [PERRL] : pupils equal round and reactive to light [EOMI] : extraocular movements intact [Normal Outer Ear/Nose] : the outer ears and nose were normal in appearance [Normal Oropharynx] : the oropharynx was normal [No JVD] : no jugular venous distention [No Lymphadenopathy] : no lymphadenopathy [Supple] : supple [Thyroid Normal, No Nodules] : the thyroid was normal and there were no nodules present [No Respiratory Distress] : no respiratory distress  [No Accessory Muscle Use] : no accessory muscle use [Clear to Auscultation] : lungs were clear to auscultation bilaterally [Normal Rate] : normal rate  [Regular Rhythm] : with a regular rhythm [Normal S1, S2] : normal S1 and S2 [No Murmur] : no murmur heard [No Carotid Bruits] : no carotid bruits [No Abdominal Bruit] : a ~M bruit was not heard ~T in the abdomen [No Varicosities] : no varicosities [Pedal Pulses Present] : the pedal pulses are present [No Edema] : there was no peripheral edema [No Palpable Aorta] : no palpable aorta [No Extremity Clubbing/Cyanosis] : no extremity clubbing/cyanosis [Soft] : abdomen soft [Non Tender] : non-tender [Non-distended] : non-distended [No Masses] : no abdominal mass palpated [No HSM] : no HSM [Normal Bowel Sounds] : normal bowel sounds [Normal Posterior Cervical Nodes] : no posterior cervical lymphadenopathy [Normal Anterior Cervical Nodes] : no anterior cervical lymphadenopathy [No CVA Tenderness] : no CVA  tenderness [No Spinal Tenderness] : no spinal tenderness [No Joint Swelling] : no joint swelling [Grossly Normal Strength/Tone] : grossly normal strength/tone [No Rash] : no rash [Coordination Grossly Intact] : coordination grossly intact [No Focal Deficits] : no focal deficits [Normal Gait] : normal gait [Deep Tendon Reflexes (DTR)] : deep tendon reflexes were 2+ and symmetric [Normal Affect] : the affect was normal [Normal Insight/Judgement] : insight and judgment were intact [Normal Voice/Communication] : normal voice/communication [Normal TMs] : both tympanic membranes were normal [Normal Supraclavicular Nodes] : no supraclavicular lymphadenopathy [Kyphosis] : no kyphosis [Scoliosis] : no scoliosis [Acne] : no acne [Speech Grossly Normal] : speech grossly normal [Memory Grossly Normal] : memory grossly normal [Alert and Oriented x3] : oriented to person, place, and time [Normal Mood] : the mood was normal [de-identified] : e [de-identified] : done by breast surgeon/GYN [de-identified] : obese [FreeTextEntry1] : done by GI

## 2022-08-02 NOTE — HEALTH RISK ASSESSMENT
[Never] : Never [No] : In the past 12 months have you used drugs other than those required for medical reasons? No [0] : 2) Feeling down, depressed, or hopeless: Not at all (0) [PHQ-2 Negative - No further assessment needed] : PHQ-2 Negative - No further assessment needed [Audit-CScore] : 0 [[id number]] : 0 [MammogramComments] : Results of most recent breast imaging to be requested from [facility] in [place]. [BoneDensityDate] : 06/21 [ColonoscopyDate] : 12/19 [BoneDensityComments] : Normal bone mass.

## 2022-08-02 NOTE — PLAN
[FreeTextEntry1] : Cardiology\par history of hyperlipidemia - continue low cholesterol/low fat diet - check FLP\par Endocrinology\par hyperglycemia - continue low carbohydrate/low sugar diet and CV exercise - check hemoglobin A1C\par history of obesity/overweight - continue low carbohydrate diet and CV exercise - check TSH\par history of Vitamin D deficiency - continue Vitamin D-3 p.o. with a meal as directed - check Vitamin D \par Gynecology\par Results of last pap smear to be requested from GYN, Dr. Wallis's office\par Oncology\par history of breast cancer - s/p excisional biopsy R LCIS in 2013; s/p bilateral lumpectomy and left SLNB on 3/30/21; s/p radiation therapy - continue Exemestane 25mg p.o.q.d. as directed - continue to follow up with oncologist, Dr. Segura and breast surgeon, Dr. Matias; Results of most recent breast imaging to be requested from [facility] in [place]\par Musculoskeletal\par bilateral knee pain - Rx given for X-Ray Knees, Bilateral \par arthralgias - continue Meloxicam 15mg p.o. p.r.n. with food as directed, Rx filled; advised against taking the medication with any other NSAID (Aleve, Advil, or Motrin) in the same 24-hour period; advised against taking the medication daily given increased risk for GI ulcers/bleeding and progressive damage to liver/kidneys \par Immunization\par Pneumovax 23 - previously discussed given history of asthma, patient to consider and follow up\par Shingrix - check Varicella Zoster IgG Antibody to determine if patient is eligible to receive the vaccine; if eligible, advised patient to determine if vaccine is covered under medical benefits of current insurance plan and follow up for 1st dose if interested; otherwise, instructed to follow up at the pharmacy for vaccine administration\par \par check EKG (results as above), female panel, hemoglobin A1C, vitamin panel, Varicella Zoster IgG Antibody, and UA w/ Reflex Urine Culture

## 2022-08-02 NOTE — HISTORY OF PRESENT ILLNESS
[FreeTextEntry1] : annual wellness visit [de-identified] : Patient is a 62 year old female with a past medical history as below who presents for an annual wellness visit. Patient has seen GYN, Dr. Wallis in the past year for her annual visit. Her last breast imaging was in 2022 (Mammogram/Breast US in January 2022, Breast MRI in July 2022). Her last bone density testing was in June 2021. Patient's last colonoscopy was in December 2019 with gastroenterologist, Dr. Duncan. Patient receives the flu vaccine annually. She has not received the Pneumonia Vaccine. She believes she has received the Tetanus Vaccine in the past 10 years. She has not received the Shingles (Shingrix) Vaccine, but does not believe she had chickenpox as a child. She is fully vaccinated against COVID-19.\par \par Patient is currently seeing oncologist, Dr. Segura given history of breast cancer. Patient is s/p excisional biopsy R LCIS in 2013. Imaging dated 2/23/21 revealed at 6:00, 8 cm from the nipple a 7 x 5 x 5 mm hypoechoic mass with irregular margins and containing some microcalcifications. Left breast ultrasound-guided biopsy was done 3/4/21; pathology came back as invasive well differentiated ductal carcinoma. Breast MRI dated 3/16/21 revealed biopsy-proven malignancy in the lower central left breast. MRI-guided biopsy right breast dated 3/18/21 revealed complex atypical papillary lesion; s/p bilateral lumpectomy and left SLNB on 3/30/21. She completed radiation therapy on 6/4/21. She was initially started on Anastrozole; discontinued secondary to side effects; now on Exemestane. She has been taking Meloxicam/Advil as needed for arthralgias (both hips, both knees, and ankles) which began shortly after starting the aromatase inhibitors. She has been taking Glucosamine/Chondroitin daily.

## 2022-08-06 ENCOUNTER — OUTPATIENT (OUTPATIENT)
Dept: OUTPATIENT SERVICES | Facility: HOSPITAL | Age: 62
LOS: 1 days | End: 2022-08-06

## 2022-08-06 ENCOUNTER — OUTPATIENT (OUTPATIENT)
Dept: OUTPATIENT SERVICES | Facility: HOSPITAL | Age: 62
LOS: 1 days | End: 2022-08-06
Payer: COMMERCIAL

## 2022-08-06 ENCOUNTER — APPOINTMENT (OUTPATIENT)
Dept: RADIOLOGY | Facility: CLINIC | Age: 62
End: 2022-08-06

## 2022-08-06 DIAGNOSIS — M25.50 PAIN IN UNSPECIFIED JOINT: ICD-10-CM

## 2022-08-06 DIAGNOSIS — Z00.8 ENCOUNTER FOR OTHER GENERAL EXAMINATION: ICD-10-CM

## 2022-08-06 PROCEDURE — 73562 X-RAY EXAM OF KNEE 3: CPT | Mod: 26,50

## 2022-08-06 PROCEDURE — 73562 X-RAY EXAM OF KNEE 3: CPT

## 2022-08-14 ENCOUNTER — NON-APPOINTMENT (OUTPATIENT)
Age: 62
End: 2022-08-14

## 2022-08-14 LAB
24R-OH-CALCIDIOL SERPL-MCNC: 57.2 PG/ML
25(OH)D3 SERPL-MCNC: 77.4 NG/ML
ALBUMIN SERPL ELPH-MCNC: 4.9 G/DL
ALP BLD-CCNC: 145 U/L
ALT SERPL-CCNC: 45 U/L
ANION GAP SERPL CALC-SCNC: 16 MMOL/L
APPEARANCE: CLEAR
AST SERPL-CCNC: 30 U/L
BASOPHILS # BLD AUTO: 0.05 K/UL
BASOPHILS NFR BLD AUTO: 1 %
BILIRUB SERPL-MCNC: 1.1 MG/DL
BILIRUBIN URINE: NEGATIVE
BLOOD URINE: NEGATIVE
BUN SERPL-MCNC: 18 MG/DL
CALCIUM SERPL-MCNC: 10.4 MG/DL
CHLORIDE SERPL-SCNC: 104 MMOL/L
CHOLEST SERPL-MCNC: 217 MG/DL
CO2 SERPL-SCNC: 22 MMOL/L
COLOR: YELLOW
CREAT SERPL-MCNC: 0.8 MG/DL
EGFR: 84 ML/MIN/1.73M2
EOSINOPHIL # BLD AUTO: 0.14 K/UL
EOSINOPHIL NFR BLD AUTO: 2.7 %
ESTIMATED AVERAGE GLUCOSE: 126 MG/DL
GLUCOSE QUALITATIVE U: NEGATIVE
GLUCOSE SERPL-MCNC: 111 MG/DL
HBA1C MFR BLD HPLC: 6 %
HCT VFR BLD CALC: 47.6 %
HCT VFR BLD CALC: 47.6 %
HDLC SERPL-MCNC: 55 MG/DL
HGB BLD-MCNC: 14.9 G/DL
HGB BLD-MCNC: 14.9 G/DL
IMM GRANULOCYTES NFR BLD AUTO: 0.2 %
IRON SERPL-MCNC: 150 UG/DL
KETONES URINE: NEGATIVE
LDLC SERPL CALC-MCNC: 142 MG/DL
LEUKOCYTE ESTERASE URINE: NEGATIVE
LYMPHOCYTES # BLD AUTO: 1.3 K/UL
LYMPHOCYTES NFR BLD AUTO: 25.4 %
MAGNESIUM SERPL-MCNC: 2.1 MG/DL
MAN DIFF?: NORMAL
MCHC RBC-ENTMCNC: 28.4 PG
MCHC RBC-ENTMCNC: 31.3 GM/DL
MCV RBC AUTO: 90.8 FL
MONOCYTES # BLD AUTO: 0.48 K/UL
MONOCYTES NFR BLD AUTO: 9.4 %
NEUTROPHILS # BLD AUTO: 3.14 K/UL
NEUTROPHILS NFR BLD AUTO: 61.3 %
NITRITE URINE: NEGATIVE
NONHDLC SERPL-MCNC: 162 MG/DL
PH URINE: 6.5
PLATELET # BLD AUTO: 294 K/UL
POTASSIUM SERPL-SCNC: 4.2 MMOL/L
PROT SERPL-MCNC: 7.5 G/DL
PROTEIN URINE: NORMAL
RBC # BLD: 5.24 M/UL
RBC # FLD: 14 %
SODIUM SERPL-SCNC: 142 MMOL/L
SPECIFIC GRAVITY URINE: 1.03
TRIGL SERPL-MCNC: 98 MG/DL
TSH SERPL-ACNC: 1.46 UIU/ML
UBIQUINONE10 SERPL-MCNC: 1.03 UG/ML
UROBILINOGEN URINE: NORMAL
VIT A SERPL-MCNC: 54.3 UG/DL
VIT B1 SERPL-MCNC: 175.3 NMOL/L
VIT B12 SERPL-MCNC: 1926 PG/ML
VIT C SERPL-MCNC: 0.3 MG/DL
VZV AB TITR SER: POSITIVE
VZV IGG SER IF-ACNC: 991.8 INDEX
WBC # FLD AUTO: 5.12 K/UL

## 2022-08-21 ENCOUNTER — TRANSCRIPTION ENCOUNTER (OUTPATIENT)
Age: 62
End: 2022-08-21

## 2022-08-21 ENCOUNTER — NON-APPOINTMENT (OUTPATIENT)
Age: 62
End: 2022-08-21

## 2022-08-22 ENCOUNTER — TRANSCRIPTION ENCOUNTER (OUTPATIENT)
Age: 62
End: 2022-08-22

## 2022-09-16 ENCOUNTER — TRANSCRIPTION ENCOUNTER (OUTPATIENT)
Age: 62
End: 2022-09-16

## 2022-09-21 ENCOUNTER — APPOINTMENT (OUTPATIENT)
Dept: SURGERY | Facility: CLINIC | Age: 62
End: 2022-09-21

## 2022-09-21 PROCEDURE — 99213K: CUSTOM

## 2022-11-30 ENCOUNTER — APPOINTMENT (OUTPATIENT)
Dept: INTERNAL MEDICINE | Facility: CLINIC | Age: 62
End: 2022-11-30

## 2022-11-30 VITALS
BODY MASS INDEX: 31.02 KG/M2 | OXYGEN SATURATION: 96 % | HEART RATE: 77 BPM | HEIGHT: 66 IN | WEIGHT: 193 LBS | SYSTOLIC BLOOD PRESSURE: 128 MMHG | DIASTOLIC BLOOD PRESSURE: 86 MMHG | TEMPERATURE: 98.6 F | RESPIRATION RATE: 17 BRPM

## 2022-11-30 VITALS — SYSTOLIC BLOOD PRESSURE: 140 MMHG | DIASTOLIC BLOOD PRESSURE: 90 MMHG

## 2022-11-30 VITALS — SYSTOLIC BLOOD PRESSURE: 142 MMHG | DIASTOLIC BLOOD PRESSURE: 88 MMHG

## 2022-11-30 PROCEDURE — 99214 OFFICE O/P EST MOD 30 MIN: CPT | Mod: 25

## 2022-11-30 NOTE — HEALTH RISK ASSESSMENT
[Never] : Never [No] : In the past 12 months have you used drugs other than those required for medical reasons? No [0] : 2) Feeling down, depressed, or hopeless: Not at all (0) [PHQ-2 Negative - No further assessment needed] : PHQ-2 Negative - No further assessment needed [No falls in past year] : Patient reported no falls in the past year [Audit-CScore] : 0 [[id number]] : 0 [Employed] : employed [BoneDensityDate] : 06/21 [BoneDensityComments] : Normal bone mass. [ColonoscopyDate] : 12/19

## 2022-11-30 NOTE — PHYSICAL EXAM
[No Acute Distress] : no acute distress [Well Nourished] : well nourished [Well Developed] : well developed [Well-Appearing] : well-appearing [Normal Voice/Communication] : normal voice/communication [Normal Sclera/Conjunctiva] : normal sclera/conjunctiva [PERRL] : pupils equal round and reactive to light [EOMI] : extraocular movements intact [Normal Outer Ear/Nose] : the outer ears and nose were normal in appearance [Normal Oropharynx] : the oropharynx was normal [Normal TMs] : both tympanic membranes were normal [No JVD] : no jugular venous distention [No Lymphadenopathy] : no lymphadenopathy [Supple] : supple [Thyroid Normal, No Nodules] : the thyroid was normal and there were no nodules present [No Respiratory Distress] : no respiratory distress  [No Accessory Muscle Use] : no accessory muscle use [Clear to Auscultation] : lungs were clear to auscultation bilaterally [Normal Rate] : normal rate  [Regular Rhythm] : with a regular rhythm [Normal S1, S2] : normal S1 and S2 [No Murmur] : no murmur heard [No Carotid Bruits] : no carotid bruits [No Abdominal Bruit] : a ~M bruit was not heard ~T in the abdomen [Pedal Pulses Present] : the pedal pulses are present [No Edema] : there was no peripheral edema [No Palpable Aorta] : no palpable aorta [No Extremity Clubbing/Cyanosis] : no extremity clubbing/cyanosis [Soft] : abdomen soft [Non Tender] : non-tender [Non-distended] : non-distended [No Masses] : no abdominal mass palpated [No HSM] : no HSM [Normal Bowel Sounds] : normal bowel sounds [Normal Supraclavicular Nodes] : no supraclavicular lymphadenopathy [Normal Posterior Cervical Nodes] : no posterior cervical lymphadenopathy [Normal Anterior Cervical Nodes] : no anterior cervical lymphadenopathy [No CVA Tenderness] : no CVA  tenderness [No Spinal Tenderness] : no spinal tenderness [No Joint Swelling] : no joint swelling [Grossly Normal Strength/Tone] : grossly normal strength/tone [No Rash] : no rash [Coordination Grossly Intact] : coordination grossly intact [No Focal Deficits] : no focal deficits [Normal Gait] : normal gait [Deep Tendon Reflexes (DTR)] : deep tendon reflexes were 2+ and symmetric [Speech Grossly Normal] : speech grossly normal [Memory Grossly Normal] : memory grossly normal [Normal Affect] : the affect was normal [Alert and Oriented x3] : oriented to person, place, and time [Normal Mood] : the mood was normal [Normal Insight/Judgement] : insight and judgment were intact [Kyphosis] : no kyphosis [Scoliosis] : no scoliosis [Acne] : no acne [de-identified] : done by breast surgeon/GYN [de-identified] : obese [de-identified] :  as per gynecology [de-identified] :  slight tremor noted

## 2022-11-30 NOTE — HISTORY OF PRESENT ILLNESS
[FreeTextEntry1] : Blood pressure check [de-identified] : Patient is a 62 year old female with a past medical history as below who presents for Blood pressure check.   patient states she has been monitoring her blood pressure at home and its been elevated.  Also has associated headaches with the elevation of the blood pressure.    Denies any head trauma.    Denies any loss of hearing, vision or taste.    Unaware what makes it better or worse.\par States she noticed the blood pressure being elevated after having the flu injection.  \par \par She was initially started on Anastrozole; discontinued secondary to side effects; now on Exemestane. Since taking Exemestane   Has noticed that her blood glucose, lipid panel and now blood pressure has been elevated.\par

## 2022-11-30 NOTE — PLAN
[FreeTextEntry1] : Cardiology\par Hypertension -  advised patient to start ramipril 5 mg daily.  discussed side effects.    Return to office next week for blood pressure check.    Advise low-sodium diet.  Weight loss.\par Check comprehensive metabolic next week.\par   Discussed medication Exemestane  -  with her oncologist\par \par history of hyperlipidemia - continue low cholesterol/low fat diet -  monitor LFTs.  Advised to return to office next week for fasting blood work.\par \par Endocrinology\par hyperglycemia - continue low carbohydrate/low sugar diet and CV exercise - check hemoglobin A1C next ov \par history of obesity/overweight - continue low carbohydrate diet and CV exercise - \par history of Vitamin D deficiency - continue Vitamin D-3 p.o. with a meal as directed -\par \par Gynecology\par Results of last pap smear to be requested from GYN, Dr. Wallis's office\par Oncology\par history of breast cancer - s/p excisional biopsy R LCIS in 2013; s/p bilateral lumpectomy and left SLNB on 3/30/21; s/p radiation therapy - continue Exemestane 25mg p.o.q.d. as directed - continue to follow up with oncologist, Dr. Segura and breast surgeon, Dr. Matias.\par \par  immunization at annual flu shot.  We  tried to acquire the date of the actual vaccination.\par \par I spent 30  Minutes with the patient, half of which we discussed finding on physical exam  and coordinated care.  As well as reviewed my plans and follow ups.

## 2022-11-30 NOTE — COUNSELING
[Benefits of weight loss discussed] : Benefits of weight loss discussed [Encouraged to increase physical activity] : Encouraged to increase physical activity [Decrease Portions] : decrease portions [Good understanding] : Patient has a good understanding of disease, goals and obesity follow-up plan [FreeTextEntry2] : DASH diet

## 2022-11-30 NOTE — REVIEW OF SYSTEMS
[Joint Pain] : joint pain [Fever] : no fever [Chills] : no chills [Fatigue] : fatigue [Discharge] : no discharge [Itching] : no itching [Earache] : no earache [Hearing Loss] : no hearing loss [Nasal Discharge] : no nasal discharge [Sore Throat] : no sore throat [Chest Pain] : no chest pain [Palpitations] : no palpitations [Leg Claudication] : no leg claudication [Lower Ext Edema] : no lower extremity edema [Abdominal Pain] : no abdominal pain [Nausea] : no nausea [Vomiting] : no vomiting [Heartburn] : no heartburn [Dysuria] : no dysuria [Hematuria] : no hematuria [Muscle Pain] : no muscle pain [Headache] : headache [Dizziness] : no dizziness [Memory Loss] : no memory loss [Unsteady Walking] : no ataxia [Insomnia] : no insomnia [Easy Bruising] : no easy bruising [Negative] : Musculoskeletal

## 2022-12-14 ENCOUNTER — RESULT CHARGE (OUTPATIENT)
Age: 62
End: 2022-12-14

## 2022-12-14 ENCOUNTER — APPOINTMENT (OUTPATIENT)
Dept: INTERNAL MEDICINE | Facility: CLINIC | Age: 62
End: 2022-12-14

## 2022-12-14 VITALS
BODY MASS INDEX: 31.04 KG/M2 | RESPIRATION RATE: 16 BRPM | HEART RATE: 77 BPM | TEMPERATURE: 97.3 F | HEIGHT: 66 IN | DIASTOLIC BLOOD PRESSURE: 82 MMHG | SYSTOLIC BLOOD PRESSURE: 120 MMHG | WEIGHT: 193.13 LBS | OXYGEN SATURATION: 98 %

## 2022-12-14 DIAGNOSIS — R25.1 TREMOR, UNSPECIFIED: ICD-10-CM

## 2022-12-14 DIAGNOSIS — E07.89 OTHER SPECIFIED DISORDERS OF THYROID: ICD-10-CM

## 2022-12-14 DIAGNOSIS — R82.998 OTHER ABNORMAL FINDINGS IN URINE: ICD-10-CM

## 2022-12-14 DIAGNOSIS — Z13.6 ENCOUNTER FOR SCREENING FOR CARDIOVASCULAR DISORDERS: ICD-10-CM

## 2022-12-14 PROCEDURE — 99214 OFFICE O/P EST MOD 30 MIN: CPT | Mod: 25

## 2022-12-14 PROCEDURE — 36415 COLL VENOUS BLD VENIPUNCTURE: CPT

## 2022-12-15 LAB
25(OH)D3 SERPL-MCNC: 75.5 NG/ML
ALBUMIN SERPL ELPH-MCNC: 4.8 G/DL
ALP BLD-CCNC: 95 U/L
ALT SERPL-CCNC: 19 U/L
ANION GAP SERPL CALC-SCNC: 12 MMOL/L
AST SERPL-CCNC: 16 U/L
BASOPHILS # BLD AUTO: 0.06 K/UL
BASOPHILS NFR BLD AUTO: 1.1 %
BILIRUB SERPL-MCNC: 0.8 MG/DL
BUN SERPL-MCNC: 19 MG/DL
CALCIUM SERPL-MCNC: 9.7 MG/DL
CHLORIDE SERPL-SCNC: 105 MMOL/L
CHOLEST SERPL-MCNC: 209 MG/DL
CO2 SERPL-SCNC: 26 MMOL/L
CREAT SERPL-MCNC: 0.83 MG/DL
EGFR: 81 ML/MIN/1.73M2
EOSINOPHIL # BLD AUTO: 0.16 K/UL
EOSINOPHIL NFR BLD AUTO: 2.8 %
ESTIMATED AVERAGE GLUCOSE: 126 MG/DL
GLUCOSE SERPL-MCNC: 122 MG/DL
HBA1C MFR BLD HPLC: 6 %
HCT VFR BLD CALC: 47.6 %
HDLC SERPL-MCNC: 48 MG/DL
HGB BLD-MCNC: 15.1 G/DL
IMM GRANULOCYTES NFR BLD AUTO: 0.4 %
LDLC SERPL CALC-MCNC: 141 MG/DL
LYMPHOCYTES # BLD AUTO: 1.32 K/UL
LYMPHOCYTES NFR BLD AUTO: 23.2 %
MAN DIFF?: NORMAL
MCHC RBC-ENTMCNC: 28.9 PG
MCHC RBC-ENTMCNC: 31.7 GM/DL
MCV RBC AUTO: 91 FL
MONOCYTES # BLD AUTO: 0.42 K/UL
MONOCYTES NFR BLD AUTO: 7.4 %
NEUTROPHILS # BLD AUTO: 3.72 K/UL
NEUTROPHILS NFR BLD AUTO: 65.1 %
NONHDLC SERPL-MCNC: 161 MG/DL
PLATELET # BLD AUTO: 286 K/UL
POTASSIUM SERPL-SCNC: 4.4 MMOL/L
PROT SERPL-MCNC: 7.4 G/DL
RBC # BLD: 5.23 M/UL
RBC # FLD: 13.7 %
SODIUM SERPL-SCNC: 143 MMOL/L
TRIGL SERPL-MCNC: 104 MG/DL
TSH SERPL-ACNC: 1.2 UIU/ML
WBC # FLD AUTO: 5.7 K/UL

## 2022-12-16 ENCOUNTER — NON-APPOINTMENT (OUTPATIENT)
Age: 62
End: 2022-12-16

## 2022-12-16 LAB — BACTERIA UR CULT: NORMAL

## 2022-12-18 PROBLEM — R25.1 TREMOR: Status: ACTIVE | Noted: 2019-11-14

## 2022-12-18 NOTE — ASSESSMENT
[FreeTextEntry1] : Patient is a 62 year old female with a past medical history as above who presents for Blood pressure check  and fasting labs

## 2022-12-18 NOTE — HEALTH RISK ASSESSMENT
[Never] : Never [No] : In the past 12 months have you used drugs other than those required for medical reasons? No [No falls in past year] : Patient reported no falls in the past year [0] : 2) Feeling down, depressed, or hopeless: Not at all (0) [PHQ-2 Negative - No further assessment needed] : PHQ-2 Negative - No further assessment needed [Employed] : employed [de-identified] :  hematology/oncology [Audit-CScore] : 0 [[id number]] : 0 [BoneDensityDate] : 06/21 [BoneDensityComments] : Normal bone mass. [ColonoscopyDate] : 12/19

## 2022-12-18 NOTE — HISTORY OF PRESENT ILLNESS
[FreeTextEntry1] : Blood pressure checkAnd fasting blood work. [de-identified] : Patient is a 62 year old female with a past medical history as below who presents for Blood pressure check and  fasting blood work.  Patient states she is taking her medication on a daily basis without any side effects.   Headache seems to be improving  but still getting the headaches on a persistent basis.  At times thinks her tremors get worse.    In the past has seen the neurologist was diagnosed with benign essential tremor\par \par  Patient requesting to see cardiology secondary to family history\par

## 2022-12-18 NOTE — PHYSICAL EXAM
[No Acute Distress] : no acute distress [Well Nourished] : well nourished [Well Developed] : well developed [Well-Appearing] : well-appearing [Normal Voice/Communication] : normal voice/communication [Normal Sclera/Conjunctiva] : normal sclera/conjunctiva [PERRL] : pupils equal round and reactive to light [EOMI] : extraocular movements intact [Normal Outer Ear/Nose] : the outer ears and nose were normal in appearance [Normal Oropharynx] : the oropharynx was normal [Normal TMs] : both tympanic membranes were normal [No JVD] : no jugular venous distention [No Lymphadenopathy] : no lymphadenopathy [Supple] : supple [No Respiratory Distress] : no respiratory distress  [No Accessory Muscle Use] : no accessory muscle use [Clear to Auscultation] : lungs were clear to auscultation bilaterally [Normal Rate] : normal rate  [Regular Rhythm] : with a regular rhythm [Normal S1, S2] : normal S1 and S2 [No Murmur] : no murmur heard [No Carotid Bruits] : no carotid bruits [No Abdominal Bruit] : a ~M bruit was not heard ~T in the abdomen [Pedal Pulses Present] : the pedal pulses are present [No Edema] : there was no peripheral edema [No Palpable Aorta] : no palpable aorta [No Extremity Clubbing/Cyanosis] : no extremity clubbing/cyanosis [Soft] : abdomen soft [Non Tender] : non-tender [Non-distended] : non-distended [No Masses] : no abdominal mass palpated [No HSM] : no HSM [Normal Bowel Sounds] : normal bowel sounds [Normal Supraclavicular Nodes] : no supraclavicular lymphadenopathy [Normal Posterior Cervical Nodes] : no posterior cervical lymphadenopathy [Normal Anterior Cervical Nodes] : no anterior cervical lymphadenopathy [No CVA Tenderness] : no CVA  tenderness [No Spinal Tenderness] : no spinal tenderness [No Joint Swelling] : no joint swelling [Grossly Normal Strength/Tone] : grossly normal strength/tone [No Rash] : no rash [Coordination Grossly Intact] : coordination grossly intact [No Focal Deficits] : no focal deficits [Normal Gait] : normal gait [Deep Tendon Reflexes (DTR)] : deep tendon reflexes were 2+ and symmetric [Speech Grossly Normal] : speech grossly normal [Memory Grossly Normal] : memory grossly normal [Normal Affect] : the affect was normal [Alert and Oriented x3] : oriented to person, place, and time [Normal Mood] : the mood was normal [Normal Insight/Judgement] : insight and judgment were intact [Kyphosis] : no kyphosis [Scoliosis] : no scoliosis [Acne] : no acne [de-identified] :  thyroid fullness [de-identified] : done by breast surgeon/GYN [de-identified] : obese [de-identified] :  as per gynecology [de-identified] :  slight tremor noted

## 2022-12-18 NOTE — REVIEW OF SYSTEMS
[Fatigue] : fatigue [Joint Pain] : joint pain [Headache] : headache [Negative] : Heme/Lymph [Fever] : no fever [Chills] : no chills [Discharge] : no discharge [Itching] : no itching [Earache] : no earache [Hearing Loss] : no hearing loss [Nasal Discharge] : no nasal discharge [Sore Throat] : no sore throat [Chest Pain] : no chest pain [Palpitations] : no palpitations [Leg Claudication] : no leg claudication [Lower Ext Edema] : no lower extremity edema [Abdominal Pain] : no abdominal pain [Nausea] : no nausea [Vomiting] : no vomiting [Heartburn] : no heartburn [Dysuria] : no dysuria [Hematuria] : no hematuria [Muscle Pain] : no muscle pain [Dizziness] : no dizziness [Memory Loss] : no memory loss [Unsteady Walking] : no ataxia [Insomnia] : no insomnia [Easy Bruising] : no easy bruising

## 2022-12-18 NOTE — PLAN
[FreeTextEntry1] : Cardiology\par Hypertension -  advised patient to start ramipril 5 mg daily.  discussed side effects.    Return to office next week for blood pressure check.    Advise low-sodium diet.  Weight loss.\par  Advised patient to follow-up with cardiology for noninvasive cardiac testing secondary to family history of coronary artery disease\par Discussed medication Exemestane  -  with her oncologist\par \par history of hyperlipidemia - continue low cholesterol/low fat diet -  monitor LFTs.  Advised to return to office next week for fasting blood work.\par \par Endocrinology\par hyperglycemia - continue low carbohydrate/low sugar diet and CV exercise - check hemoglobin A1C \par history of obesity/overweight - continue low carbohydrate diet and CV exercise - \par history of Vitamin D deficiency - continue Vitamin D-3 p.o. with a meal as directed -\par \par  thyroid fullness-advised patient to check thyroid sonogram.  Check TSH.\par \par Gynecology\par   Advised patient follow-up with gynecology once a year for clinical breast exam and routine Pap smear.\par \par Oncology\par history of breast cancer - s/p excisional biopsy R LCIS in 2013; s/p bilateral lumpectomy and left SLNB on 3/30/21; s/p radiation therapy - continue Exemestane 25mg p.o.q.d. as directed - continue to follow up with oncologist, Dr. Sgeura and breast surgeon, Dr. Matias.\par \par I spent 30  Minutes with the patient, half of which we discussed finding on physical exam  and coordinated care.  As well as reviewed my plans and follow ups.

## 2022-12-23 RX ORDER — RAMIPRIL 5 MG/1
5 CAPSULE ORAL
Qty: 90 | Refills: 0 | Status: DISCONTINUED | COMMUNITY
Start: 2022-11-30 | End: 2022-12-23

## 2022-12-27 ENCOUNTER — APPOINTMENT (OUTPATIENT)
Dept: MRI IMAGING | Facility: CLINIC | Age: 62
End: 2022-12-27
Payer: COMMERCIAL

## 2022-12-27 ENCOUNTER — APPOINTMENT (OUTPATIENT)
Dept: ULTRASOUND IMAGING | Facility: CLINIC | Age: 62
End: 2022-12-27
Payer: COMMERCIAL

## 2022-12-27 ENCOUNTER — OUTPATIENT (OUTPATIENT)
Dept: OUTPATIENT SERVICES | Facility: HOSPITAL | Age: 62
LOS: 1 days | End: 2022-12-27
Payer: COMMERCIAL

## 2022-12-27 DIAGNOSIS — R51.9 HEADACHE, UNSPECIFIED: ICD-10-CM

## 2022-12-27 DIAGNOSIS — R25.1 TREMOR, UNSPECIFIED: ICD-10-CM

## 2022-12-27 DIAGNOSIS — E07.89 OTHER SPECIFIED DISORDERS OF THYROID: ICD-10-CM

## 2022-12-27 DIAGNOSIS — Z00.8 ENCOUNTER FOR OTHER GENERAL EXAMINATION: ICD-10-CM

## 2022-12-27 PROCEDURE — 70551 MRI BRAIN STEM W/O DYE: CPT

## 2022-12-27 PROCEDURE — 70551 MRI BRAIN STEM W/O DYE: CPT | Mod: 26

## 2022-12-27 PROCEDURE — 76536 US EXAM OF HEAD AND NECK: CPT | Mod: 26

## 2022-12-27 PROCEDURE — 76536 US EXAM OF HEAD AND NECK: CPT

## 2022-12-29 ENCOUNTER — NON-APPOINTMENT (OUTPATIENT)
Age: 62
End: 2022-12-29

## 2023-01-12 ENCOUNTER — APPOINTMENT (OUTPATIENT)
Dept: MRI IMAGING | Facility: CLINIC | Age: 63
End: 2023-01-12
Payer: COMMERCIAL

## 2023-01-12 ENCOUNTER — OUTPATIENT (OUTPATIENT)
Dept: OUTPATIENT SERVICES | Facility: HOSPITAL | Age: 63
LOS: 1 days | End: 2023-01-12
Payer: COMMERCIAL

## 2023-01-12 DIAGNOSIS — Z00.8 ENCOUNTER FOR OTHER GENERAL EXAMINATION: ICD-10-CM

## 2023-01-12 PROCEDURE — C8908: CPT

## 2023-01-12 PROCEDURE — C8937: CPT

## 2023-01-12 PROCEDURE — 77049 MRI BREAST C-+ W/CAD BI: CPT | Mod: 26

## 2023-01-12 PROCEDURE — A9585: CPT

## 2023-02-03 ENCOUNTER — APPOINTMENT (OUTPATIENT)
Dept: INTERNAL MEDICINE | Facility: CLINIC | Age: 63
End: 2023-02-03
Payer: COMMERCIAL

## 2023-02-03 VITALS
WEIGHT: 193 LBS | SYSTOLIC BLOOD PRESSURE: 118 MMHG | HEIGHT: 66 IN | TEMPERATURE: 98 F | RESPIRATION RATE: 17 BRPM | OXYGEN SATURATION: 98 % | HEART RATE: 90 BPM | BODY MASS INDEX: 31.02 KG/M2 | DIASTOLIC BLOOD PRESSURE: 74 MMHG

## 2023-02-03 DIAGNOSIS — R05.9 COUGH, UNSPECIFIED: ICD-10-CM

## 2023-02-03 PROCEDURE — 36415 COLL VENOUS BLD VENIPUNCTURE: CPT

## 2023-02-03 PROCEDURE — 99214 OFFICE O/P EST MOD 30 MIN: CPT | Mod: 25

## 2023-02-04 NOTE — PHYSICAL EXAM
[No Acute Distress] : no acute distress [Well Nourished] : well nourished [Well Developed] : well developed [Well-Appearing] : well-appearing [Normal Sclera/Conjunctiva] : normal sclera/conjunctiva [PERRL] : pupils equal round and reactive to light [EOMI] : extraocular movements intact [Normal Outer Ear/Nose] : the outer ears and nose were normal in appearance [Normal Oropharynx] : the oropharynx was normal [No JVD] : no jugular venous distention [No Lymphadenopathy] : no lymphadenopathy [Supple] : supple [Thyroid Normal, No Nodules] : the thyroid was normal and there were no nodules present [No Respiratory Distress] : no respiratory distress  [No Accessory Muscle Use] : no accessory muscle use [Clear to Auscultation] : lungs were clear to auscultation bilaterally [Normal Rate] : normal rate  [Regular Rhythm] : with a regular rhythm [Normal S1, S2] : normal S1 and S2 [No Murmur] : no murmur heard [No Carotid Bruits] : no carotid bruits [No Abdominal Bruit] : a ~M bruit was not heard ~T in the abdomen [No Varicosities] : no varicosities [No Edema] : there was no peripheral edema [No Palpable Aorta] : no palpable aorta [Soft] : abdomen soft [Non Tender] : non-tender [Non-distended] : non-distended [No Masses] : no abdominal mass palpated [No HSM] : no HSM [Normal Bowel Sounds] : normal bowel sounds [Normal Posterior Cervical Nodes] : no posterior cervical lymphadenopathy [Normal Anterior Cervical Nodes] : no anterior cervical lymphadenopathy [No CVA Tenderness] : no CVA  tenderness [No Spinal Tenderness] : no spinal tenderness [No Joint Swelling] : no joint swelling [Grossly Normal Strength/Tone] : grossly normal strength/tone [No Rash] : no rash [Coordination Grossly Intact] : coordination grossly intact [No Focal Deficits] : no focal deficits [Normal Gait] : normal gait [Deep Tendon Reflexes (DTR)] : deep tendon reflexes were 2+ and symmetric [Normal Affect] : the affect was normal [Normal Insight/Judgement] : insight and judgment were intact [Normal Voice/Communication] : normal voice/communication [Normal TMs] : both tympanic membranes were normal [Normal Nasal Mucosa] : the nasal mucosa was normal [Normal Supraclavicular Nodes] : no supraclavicular lymphadenopathy [Kyphosis] : no kyphosis [Scoliosis] : no scoliosis [Acne] : no acne [Speech Grossly Normal] : speech grossly normal [Memory Grossly Normal] : memory grossly normal [Alert and Oriented x3] : oriented to person, place, and time [Normal Mood] : the mood was normal [de-identified] : obese

## 2023-02-04 NOTE — REVIEW OF SYSTEMS
[Negative] : Heme/Lymph [Cough] : cough [Heartburn] : heartburn [Nasal Discharge] : nasal discharge [FreeTextEntry4] : nasal congestion

## 2023-02-04 NOTE — PLAN
[FreeTextEntry1] : Pulmonary\par cough - Rx given for CXR - if negative, recommended trial of PPI/H2 Blocker as cough may be secondary to GERD\par Cardiology\par history of hyperlipidemia - continue Rosuvastatin Calcium 5mg p.o.q.d. as directed - continue low cholesterol/low fat diet - check FLP and LFTs\par Endocrinology\par hyperglycemia - continue Metformin HCl 500mg p.o.q.d. as directed; recommended changing to Metformin HCl ER 500mg pending results of blood work - continue low carbohydrate/low sugar diet; continue CV exercise - check hemoglobin A1C; check Creatinine\par history of obesity/overweight - continue low carbohydrate diet and CV exercise \par history of Vitamin D deficiency - continue Vitamin D-3 p.o. with a meal as directed\par Oncology\par history of breast cancer - s/p excisional biopsy R LCIS in 2013; s/p bilateral lumpectomy and left SLNB on 3/30/21; s/p radiation therapy - continue Exemestane 25mg p.o.q.d. as directed - continue to follow up with oncologist, Dr. Segura and breast surgeon, Dr. Matias\par Musculoskeletal\par arthralgias - continue Meloxicam 15mg p.o. p.r.n. with food as directed; previously advised against taking the medication with any other NSAID (Aleve, Advil, or Motrin) in the same 24-hour period; previously advised against taking the medication daily given increased risk for GI ulcers/bleeding and progressive damage to liver/kidneys \par Immunization\par Pneumovax 23 - previously discussed given history of asthma, patient to consider and follow up\par Shingrix - previously discussed, patient to determine if vaccine is covered under medical benefits of current insurance plan and follow up for 1st dose if interested; otherwise, instructed to follow up at the pharmacy for vaccine administration\par \par check CMP, FLP, and hemoglobin A1C

## 2023-02-04 NOTE — ADDENDUM
[FreeTextEntry1] : I, Torito Stoll, acted solely as scribe for Dr. Juan Pablo Marx, DO this date 02/03/2023  9:10AM .\par \par All medical record entries made by the Scribe were at my, Dr. Juan Pablo Marx, DO direction and personally dictated by me on 02/03/2023  9:10AM. I have reviewed the chart and agree that the record accurately reflects my personal performance of the history, physical exam, assessment and plan. I have also personally directed, reviewed and agreed with the chart.

## 2023-02-04 NOTE — HEALTH RISK ASSESSMENT
[No] : In the past 12 months have you used drugs other than those required for medical reasons? No [0] : 2) Feeling down, depressed, or hopeless: Not at all (0) [PHQ-2 Negative - No further assessment needed] : PHQ-2 Negative - No further assessment needed [Audit-CScore] : 0 [BFY0Czkft] : 0 [MammogramDate] : 01/22 [MammogramComments] : No mammographic or sonographic evidence of malignancy; BI-RADS 2: Benign findings; Breast MRI (7/8/22): BI-RADS 3: Probably benign findings. [PapSmearDate] : 11/21 [PapSmearComments] : NILM.  [BoneDensityDate] : 06/21 [ColonoscopyDate] : 12/19 [BoneDensityComments] : Normal bone mass.

## 2023-02-04 NOTE — HISTORY OF PRESENT ILLNESS
[FreeTextEntry1] : fasting blood work and general follow-up  [de-identified] : Patient is a 62 year old female with a past medical history as below who presents for fasting blood work and general follow-up.\par \par Patient states she is taking all medications as prescribed. She denies any new arthralgias or myalgias on Rosuvastatin Calcium. She denies any gastrointestinal issues on Metformin. \par Patient was taken off Ramipril secondary to worsening cough. \par \par Patient is currently seeing oncologist, Dr. Segura given history of breast cancer. Patient is s/p excisional biopsy R LCIS in 2013. Imaging dated 2/23/21 revealed at 6:00, 8 cm from the nipple a 7 x 5 x 5 mm hypoechoic mass with irregular margins and containing some microcalcifications. Left breast ultrasound-guided biopsy was done 3/4/21; pathology came back as invasive well differentiated ductal carcinoma. Breast MRI dated 3/16/21 revealed biopsy-proven malignancy in the lower central left breast. MRI-guided biopsy right breast dated 3/18/21 revealed complex atypical papillary lesion; s/p bilateral lumpectomy and left SLNB on 3/30/21. She completed radiation therapy on 6/4/21. She was initially started on Anastrozole; discontinued secondary to side effects; now on Exemestane. She has been taking Meloxicam as needed for arthralgias (both hips, both knees, ankles) which began shortly after starting the aromatase inhibitors.\par \par Patient continues to note cough; started several months ago; occasionally paroxysmal. She states the cough is not productive of mucus. She has noted mild nasal congestion/discharge. She denies itchy/watery eyes or post-nasal drip. She intermittently notes heartburn, no belching.

## 2023-02-06 ENCOUNTER — APPOINTMENT (OUTPATIENT)
Dept: RADIOLOGY | Facility: CLINIC | Age: 63
End: 2023-02-06
Payer: COMMERCIAL

## 2023-02-06 ENCOUNTER — OUTPATIENT (OUTPATIENT)
Dept: OUTPATIENT SERVICES | Facility: HOSPITAL | Age: 63
LOS: 1 days | End: 2023-02-06
Payer: COMMERCIAL

## 2023-02-06 DIAGNOSIS — Z00.8 ENCOUNTER FOR OTHER GENERAL EXAMINATION: ICD-10-CM

## 2023-02-06 DIAGNOSIS — R05.9 COUGH, UNSPECIFIED: ICD-10-CM

## 2023-02-06 PROCEDURE — 71046 X-RAY EXAM CHEST 2 VIEWS: CPT

## 2023-02-06 PROCEDURE — 71046 X-RAY EXAM CHEST 2 VIEWS: CPT | Mod: 26

## 2023-02-07 ENCOUNTER — TRANSCRIPTION ENCOUNTER (OUTPATIENT)
Age: 63
End: 2023-02-07

## 2023-02-07 RX ORDER — METFORMIN HYDROCHLORIDE 500 MG/1
500 TABLET, COATED ORAL DAILY
Qty: 90 | Refills: 0 | Status: DISCONTINUED | COMMUNITY
Start: 2022-12-15 | End: 2023-02-07

## 2023-02-10 LAB
ALBUMIN SERPL ELPH-MCNC: 4.7 G/DL
ALP BLD-CCNC: 96 U/L
ALT SERPL-CCNC: 20 U/L
ANION GAP SERPL CALC-SCNC: 11 MMOL/L
AST SERPL-CCNC: 19 U/L
BILIRUB SERPL-MCNC: 0.9 MG/DL
BUN SERPL-MCNC: 17 MG/DL
CALCIUM SERPL-MCNC: 10 MG/DL
CHLORIDE SERPL-SCNC: 105 MMOL/L
CHOLEST SERPL-MCNC: 131 MG/DL
CO2 SERPL-SCNC: 26 MMOL/L
CREAT SERPL-MCNC: 0.77 MG/DL
EGFR: 88 ML/MIN/1.73M2
ESTIMATED AVERAGE GLUCOSE: 126 MG/DL
GLUCOSE SERPL-MCNC: 130 MG/DL
HBA1C MFR BLD HPLC: 6 %
HDLC SERPL-MCNC: 53 MG/DL
LDLC SERPL CALC-MCNC: 69 MG/DL
NONHDLC SERPL-MCNC: 78 MG/DL
POTASSIUM SERPL-SCNC: 4.4 MMOL/L
PROT SERPL-MCNC: 7 G/DL
SODIUM SERPL-SCNC: 142 MMOL/L
TRIGL SERPL-MCNC: 45 MG/DL

## 2023-02-24 ENCOUNTER — APPOINTMENT (OUTPATIENT)
Dept: OBGYN | Facility: CLINIC | Age: 63
End: 2023-02-24
Payer: COMMERCIAL

## 2023-02-24 VITALS
RESPIRATION RATE: 16 BRPM | OXYGEN SATURATION: 97 % | DIASTOLIC BLOOD PRESSURE: 66 MMHG | SYSTOLIC BLOOD PRESSURE: 110 MMHG | HEART RATE: 83 BPM | BODY MASS INDEX: 30.7 KG/M2 | HEIGHT: 66 IN | WEIGHT: 191 LBS

## 2023-02-24 DIAGNOSIS — M25.50 PAIN IN UNSPECIFIED JOINT: ICD-10-CM

## 2023-02-24 PROCEDURE — 99396 PREV VISIT EST AGE 40-64: CPT

## 2023-02-24 PROCEDURE — 76830 TRANSVAGINAL US NON-OB: CPT

## 2023-02-24 PROCEDURE — 82270 OCCULT BLOOD FECES: CPT

## 2023-02-24 PROCEDURE — 76857 US EXAM PELVIC LIMITED: CPT

## 2023-02-24 NOTE — HISTORY OF PRESENT ILLNESS
[Y] : Positive pregnancy history [Yes] : pregnancy [Post-Menopause, No Sxs] : post-menopausal, currently without symptoms [Currently Active] : currently active [Men] : men [No] : No [FreeTextEntry1] : The patient presents today for a routine GYN exam.  She offers no complaints.  We reviewed together in detail her past medical and surgical histories, allergies and medication usage, social and family history.   All questions were answered in easy to understand language.\par Patient is doing well status post treatment with an aromatase inhibitor for her breast malignancy.  The patient is status post excision of invasive ductal carcinoma in the left breast and right breast ductal carcinoma in situ. [Mammogramdate] : 1/25/22 [TextBox_19] : pt due in july [BreastSonogramDate] : 1/25/22 [PapSmeardate] : 11/20/21 [BoneDensityDate] : 6/4/21 [ColonoscopyDate] : 2020 [TextBox_78] : no hx [LMPDate] : 2013 [PGxTotal] : 7 [[facility]] : 2 [PGHxAbortions] : 2 [PGxABSpont] : 3 [PGxEctopic] : 2 [TextBox_6] : 2013 [TextBox_9] : 13

## 2023-02-24 NOTE — PLAN
[FreeTextEntry1] : I have spent 40 minutes of time on this encounter.  Greater than 50% of the face-to-face encounter time was spent on counseling and/or coordination of care for examination findings, differential, testing, management and planning. 10 minutes were allotted to discussing the depression screening.\par Yearly breast cancer screening with no current clinical or radiographic concerns.  The patient was reminded regarding future well breast and general healthcare, breast cancer risk reduction, the importance of self-examination and the need for follow up.   She was again reminded of the need to take Vit D3 2500  IU daily or to keep a Vit D level above 30, and Tumeric 1000mg daily with Black Pepper.  Plan continued yearly imaging and breast follow up, sooner as needed.  I counseled the patient on current recommendations to reduce breast cancer risk including but not inclusive to regular exercise 20-30 minutes 3-4 times a week, low fat diet, limiting alcohol consumption, maintenance of ideal body weight, yearly imaging and self breast awareness.  Questions answered.  I encouraged in light of Covid 19, social distancing, frequent hand washing and precautions to stay healthy.\par \par 1)  Self breast exam instructions, calcium supplementation discussed with the patient.\par 2)  Mammography, lipid profile assessment, TSH screening, fasting glucose testing, colonoscopy screening were discussed with the patient.  Vitamin D supplementation\par 3)  Maintain healthy weight.\par 4)  Regular health maintenance with PCP.\par 5)  Remain tobacco free.\par 6)  Limit alcohol intake to less than 5 drinks per week.\par 7)  Osteoporosis screening.\par 8)  Annual cholesterol screening.\par 9)  Annual influenza vaccine.The importance of routine physical activity was reviewed and a goal of 150 minutes of moderately vigorous exercise per week was endorsed.\par The ultrasound was reviewed demonstrating a minimally increased endometrium in terms of thickness this was discussed with the patient and the recommendation for the patient to undergo a sampling of the endometrium was made all questions were answered and the patient will decide accordingly.  RX will be sent to her pharmacy if she elects to do an in office endometrial biopsy.

## 2023-02-27 LAB
HPV 16 E6+E7 MRNA CVX QL NAA+PROBE: NOT DETECTED
HPV18+45 E6+E7 MRNA CVX QL NAA+PROBE: NOT DETECTED

## 2023-02-28 ENCOUNTER — NON-APPOINTMENT (OUTPATIENT)
Age: 63
End: 2023-02-28

## 2023-02-28 LAB — CYTOLOGY CVX/VAG DOC THIN PREP: ABNORMAL

## 2023-03-21 ENCOUNTER — TRANSCRIPTION ENCOUNTER (OUTPATIENT)
Age: 63
End: 2023-03-21

## 2023-04-04 ENCOUNTER — OUTPATIENT (OUTPATIENT)
Dept: OUTPATIENT SERVICES | Facility: HOSPITAL | Age: 63
LOS: 1 days | Discharge: HOME | End: 2023-04-04

## 2023-04-04 DIAGNOSIS — C50.919 MALIGNANT NEOPLASM OF UNSPECIFIED SITE OF UNSPECIFIED FEMALE BREAST: ICD-10-CM

## 2023-04-06 ENCOUNTER — APPOINTMENT (OUTPATIENT)
Dept: HEMATOLOGY ONCOLOGY | Facility: CLINIC | Age: 63
End: 2023-04-06
Payer: COMMERCIAL

## 2023-04-06 VITALS
DIASTOLIC BLOOD PRESSURE: 83 MMHG | RESPIRATION RATE: 16 BRPM | HEART RATE: 76 BPM | WEIGHT: 195.11 LBS | OXYGEN SATURATION: 98 % | SYSTOLIC BLOOD PRESSURE: 127 MMHG | TEMPERATURE: 97 F | BODY MASS INDEX: 31.49 KG/M2

## 2023-04-06 PROCEDURE — 99214 OFFICE O/P EST MOD 30 MIN: CPT

## 2023-04-06 NOTE — REVIEW OF SYSTEMS
[Night Sweats] : no night sweats [Negative] : Allergic/Immunologic [de-identified] : hair thinning

## 2023-04-06 NOTE — PHYSICAL EXAM
[Fully active, able to carry on all pre-disease performance without restriction] : Status 0 - Fully active, able to carry on all pre-disease performance without restriction [Normal] : affect appropriate [de-identified] : b/l lumpectomies, minimal hyperpigmentation secondary to RT , no palpable masses or adenopathy b/l

## 2023-04-06 NOTE — ASSESSMENT
[FreeTextEntry1] : 62 y/o post-menopausal female diagnosed with left breast stage IA ER/DC+, HER2- cancer as well as right breast DCIS diagnosed 3/2021.  She is s/p bilateral lumpectomy and left SLNB.  Oncotype Dx recurrence score 20 (~1.6 CT benefit).  s/p RT.  Here for follow up.\par \par -Started endocrine therapy anastrozole 6/21/21, now on exemestane tolerating well\par -clinically ANNY \par -mammo/US up to date\par -breast MRI UTD\par - bone density due 6/2023 - Ordered\par -continue to follow up with Dr. Matias as recommended\par -labs reviewed from 2/20223 - wnl\par -FU in 6 months with Dr. Thao \par \par Uterine Lining thickening\par - Per patient's gyn her lining was slightly thickened and he is recommending endometrial biopsy which is scheduled for next week\par - continue gyn f/up \par \par PMD at least annually with lipid checks/bloodwork, gyn at least annually and PAP test screening per their recommendations, colon cancer screening/colonoscopy at least every 10 years as recommended by PMD/GI , dermatology for annual skin checks  ophthalmology for annual eye exams\par \par \par \par  [Curative] : Goals of care discussed with patient: Curative

## 2023-04-06 NOTE — HISTORY OF PRESENT ILLNESS
[Disease: _____________________] : Disease: [unfilled] [T: ___] : T[unfilled] [N: ___] : N[unfilled] [AJCC Stage: ____] : AJCC Stage: [unfilled] [de-identified] : The patient has history of right excisional biopsy for LCIS on R in 2013.  \par \par Was having alternating mammograms/US and MRI - delayed due to COVID.\par \par The patient had routine mammogram and sonogram 1/18/21 followed by additional imaging 2/23/21 that showed at 6:00, 8 cm from the nipple a 7 x 5 x 5 mm hypoechoic mass with irregular margins, and containing some microcalcifications. Ultrasound-guided biopsy was recommended.\par \par Left breast ultrasound-guided biopsy was done 3/4/21.  Pathology came back as invasive well differentiated ductal carcinoma.  Page score 5/9 (2 + 2 + 1) in this limited material. Invasive tumor measures at least 0.6 cm on the slide.  Rare isolated microcalcifications present in the tumor.   Atypical duct epithelial hyperplasia in one duct. ER positive (90%), OK positive (90%), HER2 negative.\par \par She was sent for breast MRI,  3/16/21 that showed biopsy-proven malignancy in the lower central left breast, measuring 1.2 x 0.9 x 0.9 cm.  7 mm enhancing mass in the lower central right breast. MRI guided biopsy is recommended.  A 4 mm enhancing dermal lesion in the lower outer left breast. Direct inspection is recommended.  BIRADS 4B\par \par MRI-guided biopsy right breast was done 3/18/21 and revealed a complex atypical papillary lesion.\par \par The patient underwent a bilateral lumpectomy and left SLNB with Dr. Matias on 3/30/21.  \par \par Final pathology of right lumpectomy: DCIS, cribriform and micropapillary types with low nuclear grade. ER positive (>98%), OK positive (90%).  \par \par Left lumpectomy with SLNB final pathology: IDC, moderately differentiated (size: 1.1 cm).  DCIS, solid cribriform and flat types with intermediate nuclear grade and focal microcalcifications.  DCIS shows lobular extensions.  LCIS, classic type. Proliferative fibrocystic change with micro calcifications. All final margins are not involved by invasive carcinoma, distance from closest margin in > 5 mm.  Margin uninvolved by DCIS, distance of nearest margin [inferior margin] =  1.5 mm.  Three lymph nodes, negative for carcinoma (0/3).\par \par Oncotype Dx recurrence score 20, distant recurrence risk at 9 years 6%, CT benefit by age and RS result ~1.6%\par \par She met with Dr. Pickard and completed radiation therapy b/l on June 14, 2021 - no complications/issues related to RT [de-identified] : Left IDC, DCIS; Right DCIS [de-identified] : Left: ER/DC+, HER2-; Right: ER/DC+ [de-identified] : Oncotype Dx recurrence score 20, distant recurrence risk at 9 years 6%, CT benefit by age and RS result ~1.6% [de-identified] : 4/6/2023\par Patient presents today to assess for evidence of breast cancer recurrence and assess treatment toxicity.\par Anastrozole started 6/21/21 now on exemestane and tolerating well\par Patient denies any hotflashes, arthralgias, vaginal dryness, vaginal bleeding, hair loss, muscle cramps.\par Patient denies any breast masses, breast tenderness, skin changes or nipple discharge.\par Patient denies any SOB, CP, abdominal pain, bone pain, headache, or unexplained weight loss\par MRI Breast and mammogram up to date\par Sees Dr. Matias annually\par Labs done 2/2023\par \par \par HEALTH MAINTENANCE \par PCP Dr. Juan Pablo Marx, started on metformin and crestor\par Bone Density: 6.21 - normal bone mass\par NEURO essential tremor - extensive workup with neurology negative, has had it since her 20s, maybe slightly worse over time; MRI Head 12/2022 - no evidence of cancer\par GI Appendectomy 21 years ago, Colonoscopy 2019 - 5 years next\par COVID vaccine done - Pfizer, 2nd dose in January\par Gyn 2019 - minor vaginal spotting - lining thickened - D&C normal. LMP age 52. Used OCPs in the past\par She has 2 children - son 21, daughter 18. 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 \par s/p COVID vaccine, booster x 1 left arm

## 2023-04-17 ENCOUNTER — NON-APPOINTMENT (OUTPATIENT)
Age: 63
End: 2023-04-17

## 2023-04-18 ENCOUNTER — OUTPATIENT (OUTPATIENT)
Dept: OUTPATIENT SERVICES | Facility: HOSPITAL | Age: 63
LOS: 1 days | End: 2023-04-18
Payer: COMMERCIAL

## 2023-04-18 VITALS
OXYGEN SATURATION: 97 % | TEMPERATURE: 97 F | SYSTOLIC BLOOD PRESSURE: 118 MMHG | HEART RATE: 83 BPM | DIASTOLIC BLOOD PRESSURE: 81 MMHG | RESPIRATION RATE: 16 BRPM | WEIGHT: 194.01 LBS | HEIGHT: 66 IN

## 2023-04-18 DIAGNOSIS — Z01.818 ENCOUNTER FOR OTHER PREPROCEDURAL EXAMINATION: ICD-10-CM

## 2023-04-18 DIAGNOSIS — R93.89 ABNORMAL FINDINGS ON DIAGNOSTIC IMAGING OF OTHER SPECIFIED BODY STRUCTURES: ICD-10-CM

## 2023-04-18 DIAGNOSIS — Z98.890 OTHER SPECIFIED POSTPROCEDURAL STATES: Chronic | ICD-10-CM

## 2023-04-18 LAB
A1C WITH ESTIMATED AVERAGE GLUCOSE RESULT: 6 % — HIGH (ref 4–5.6)
ANION GAP SERPL CALC-SCNC: 5 MMOL/L — SIGNIFICANT CHANGE UP (ref 5–17)
BUN SERPL-MCNC: 19 MG/DL — SIGNIFICANT CHANGE UP (ref 7–23)
CALCIUM SERPL-MCNC: 10 MG/DL — SIGNIFICANT CHANGE UP (ref 8.5–10.1)
CHLORIDE SERPL-SCNC: 108 MMOL/L — SIGNIFICANT CHANGE UP (ref 96–108)
CO2 SERPL-SCNC: 26 MMOL/L — SIGNIFICANT CHANGE UP (ref 22–31)
CREAT SERPL-MCNC: 0.89 MG/DL — SIGNIFICANT CHANGE UP (ref 0.5–1.3)
EGFR: 74 ML/MIN/1.73M2 — SIGNIFICANT CHANGE UP
ESTIMATED AVERAGE GLUCOSE: 126 MG/DL — HIGH (ref 68–114)
GLUCOSE SERPL-MCNC: 112 MG/DL — HIGH (ref 70–99)
HCT VFR BLD CALC: 45.2 % — HIGH (ref 34.5–45)
HGB BLD-MCNC: 14.8 G/DL — SIGNIFICANT CHANGE UP (ref 11.5–15.5)
MCHC RBC-ENTMCNC: 28.8 PG — SIGNIFICANT CHANGE UP (ref 27–34)
MCHC RBC-ENTMCNC: 32.7 GM/DL — SIGNIFICANT CHANGE UP (ref 32–36)
MCV RBC AUTO: 87.9 FL — SIGNIFICANT CHANGE UP (ref 80–100)
NRBC # BLD: 0 /100 WBCS — SIGNIFICANT CHANGE UP (ref 0–0)
PLATELET # BLD AUTO: 267 K/UL — SIGNIFICANT CHANGE UP (ref 150–400)
POTASSIUM SERPL-MCNC: 4.1 MMOL/L — SIGNIFICANT CHANGE UP (ref 3.5–5.3)
POTASSIUM SERPL-SCNC: 4.1 MMOL/L — SIGNIFICANT CHANGE UP (ref 3.5–5.3)
RBC # BLD: 5.14 M/UL — SIGNIFICANT CHANGE UP (ref 3.8–5.2)
RBC # FLD: 13.2 % — SIGNIFICANT CHANGE UP (ref 10.3–14.5)
SODIUM SERPL-SCNC: 139 MMOL/L — SIGNIFICANT CHANGE UP (ref 135–145)
WBC # BLD: 6.38 K/UL — SIGNIFICANT CHANGE UP (ref 3.8–10.5)
WBC # FLD AUTO: 6.38 K/UL — SIGNIFICANT CHANGE UP (ref 3.8–10.5)

## 2023-04-18 PROCEDURE — G0463: CPT

## 2023-04-18 PROCEDURE — 83036 HEMOGLOBIN GLYCOSYLATED A1C: CPT

## 2023-04-18 PROCEDURE — 36415 COLL VENOUS BLD VENIPUNCTURE: CPT

## 2023-04-18 PROCEDURE — 85027 COMPLETE CBC AUTOMATED: CPT

## 2023-04-18 PROCEDURE — 86850 RBC ANTIBODY SCREEN: CPT

## 2023-04-18 PROCEDURE — 93005 ELECTROCARDIOGRAM TRACING: CPT

## 2023-04-18 PROCEDURE — 80048 BASIC METABOLIC PNL TOTAL CA: CPT

## 2023-04-18 PROCEDURE — 86900 BLOOD TYPING SEROLOGIC ABO: CPT

## 2023-04-18 PROCEDURE — 86901 BLOOD TYPING SEROLOGIC RH(D): CPT

## 2023-04-18 PROCEDURE — 93010 ELECTROCARDIOGRAM REPORT: CPT

## 2023-04-18 RX ORDER — CHOLECALCIFEROL (VITAMIN D3) 125 MCG
1 CAPSULE ORAL
Qty: 0 | Refills: 0 | DISCHARGE

## 2023-04-18 NOTE — H&P PST ADULT - NSICDXPASTMEDICALHX_GEN_ALL_CORE_FT
PAST MEDICAL HISTORY:  Asthma never intubated or hospitalized for it, on no meds now    Ductal carcinoma in situ of right breast     Dyslipidemia     Essential tremor     Invasive ductal carcinoma of left breast     Prediabetes     Seasonal allergies

## 2023-04-18 NOTE — H&P PST ADULT - ATTENDING COMMENTS
The risks, benefits and alternatives to the planned procedure has been discussed at length in easy to understand language.  The patient was given the opportunity to ask questions and all questions were answered in an easy to understand manner.

## 2023-04-18 NOTE — H&P PST ADULT - NSICDXFAMILYHX_GEN_ALL_CORE_FT
FAMILY HISTORY:  Family hx of hypertension, mother & brother  FHx: esophageal cancer, mother  FHx: heart disease, mother    Father  Still living? Unknown  FH: type 2 diabetes, Age at diagnosis: Age Unknown    Mother  Still living? Unknown  FH: myocardial infarction, Age at diagnosis: Age Unknown

## 2023-04-18 NOTE — H&P PST ADULT - PROBLEM SELECTOR PLAN 2
Labs CBC, BMP, A1C, and EKG   Medical clearance necessary  Pre op instructions reviewed and given.   No meds to take am of surgery.   Instructed to hold and/or avoid other NSAIDs and OTC supplements. Tylenol is ok. Verbalized understanding

## 2023-04-18 NOTE — H&P PST ADULT - PROBLEM SELECTOR PLAN 1
Hysteroscopy Bx Endometrial & Polypc w/wo Dilation & Curettage by Dr. Wallis on 4/27/2023. Hysteroscopy Bx Endometrial & Polypectomy w/wo Dilation & Curettage by Dr. Wallis on 4/27/2023.

## 2023-04-18 NOTE — H&P PST ADULT - HISTORY OF PRESENT ILLNESS
63 y/o female with PMH Left Breast Invasive Carcinoma and Right Breast Ductal Carcinoma In-Situ with SHx B/L Lumpectomy (2021) for PST having Hysteroscopy Bx Endometrial & Polypc w/wo Dilation & Curettage by Dr. Wallis on 4/27/2023.  She reports increased endometrium found incidentally on last TVUS, given Hx Breast Ca recommended to have procedure.  Denies any abnormal vaginal bleeding or pelvic pain.

## 2023-04-18 NOTE — H&P PST ADULT - NSICDXPASTSURGICALHX_GEN_ALL_CORE_FT
PAST SURGICAL HISTORY:  H/O breast biopsy core biopsy in Sept 2012    H/O lumpectomy     Hx of appendectomy

## 2023-04-21 ENCOUNTER — APPOINTMENT (OUTPATIENT)
Dept: INTERNAL MEDICINE | Facility: CLINIC | Age: 63
End: 2023-04-21
Payer: COMMERCIAL

## 2023-04-21 VITALS
TEMPERATURE: 97.2 F | DIASTOLIC BLOOD PRESSURE: 82 MMHG | WEIGHT: 196.38 LBS | OXYGEN SATURATION: 98 % | SYSTOLIC BLOOD PRESSURE: 130 MMHG | HEIGHT: 60 IN | BODY MASS INDEX: 38.56 KG/M2 | HEART RATE: 87 BPM

## 2023-04-21 DIAGNOSIS — Z86.39 PERSONAL HISTORY OF OTHER ENDOCRINE, NUTRITIONAL AND METABOLIC DISEASE: ICD-10-CM

## 2023-04-21 DIAGNOSIS — R93.89 ABNORMAL FINDINGS ON DIAGNOSTIC IMAGING OF OTHER SPECIFIED BODY STRUCTURES: ICD-10-CM

## 2023-04-21 PROCEDURE — 99214 OFFICE O/P EST MOD 30 MIN: CPT

## 2023-04-23 PROBLEM — Z86.39 HISTORY OF OBESITY: Status: RESOLVED | Noted: 2019-09-13 | Resolved: 2023-04-23

## 2023-04-23 PROBLEM — R93.89 ENDOMETRIAL THICKENING ON ULTRASOUND: Status: ACTIVE | Noted: 2023-04-23

## 2023-04-23 NOTE — PLAN
[FreeTextEntry1] : 1. Preoperative EKG performed - results are noted above. \par 2. The patient was instructed to stop eating prior to midnight the evening before the procedure.\par 3. The patient was advised to stop medications 1 day prior to the procedure. Advised to stop all vitamins and Meloxicam 15 MG one week prior to procedure. Stop Metformin HCl  MG one day prior to procedure.\par 4. There is no medical contraindication to the planned procedure and the patient is therefore medically optimized/cleared for the procedure pending results of COVID-19 PCR (Nasopharyngeal Swab) if necessary.

## 2023-04-23 NOTE — HISTORY OF PRESENT ILLNESS
[No Pertinent Cardiac History] : no history of aortic stenosis, atrial fibrillation, coronary artery disease, recent myocardial infarction, or implantable device/pacemaker [Asthma] : asthma [No Adverse Anesthesia Reaction] : no adverse anesthesia reaction in self or family member [COPD] : no COPD [Sleep Apnea] : no sleep apnea [Chronic Anticoagulation] : no chronic anticoagulation [Smoker] : not a smoker [Chronic Kidney Disease] : no chronic kidney disease [Diabetes] : no diabetes [FreeTextEntry1] : Endometrial biopsy [FreeTextEntry3] : Dr. Wallis [FreeTextEntry2] : 4/27/23 [FreeTextEntry4] : Patient is a 62 year-old female with a past medical history as below who presents for preoperative evaluation prior to endometrial biopsy procedure. The procedure will be performed by Dr. Wallis at [facility]. The patient has no history of allergy or adverse reaction to anesthesia. The patient can walk many blocks and can walk up 1-2 flights of stairs without dyspnea on exertion. Denies chest pain, palpitations, SOB.

## 2023-04-23 NOTE — ASSESSMENT
[Patient Optimized for Surgery] : Patient optimized for surgery [No Further Testing Recommended] : no further testing recommended [As per surgery] : as per surgery [FreeTextEntry4] : The patient is a 62 year old female who is a low risk surgical patient with good functional capacity going for an intermediate risk surgical procedure

## 2023-04-23 NOTE — RESULTS/DATA
[] : results reviewed [de-identified] : WNL [de-identified] : BMP WNL except elevated glucose (112), elevated A1c 6.0 [de-identified] : NSR 66 bpm with sinus arrhythmia

## 2023-04-23 NOTE — END OF VISIT
[FreeTextEntry3] : This note was written by Irina García on 04/21/2023, acting as a scribe for Dr. Juan Pablo Marx DO.\par \par All medic record entries were at my, Dr. Juan Pablo Marx DO , direction and personally dictated by me in 04/21/2023. I have personally reviewed the chart and agree that the record accurately reflects my personal performance of the history, physical exam, assessment, and plan.\par

## 2023-04-23 NOTE — PHYSICAL EXAM
[No Acute Distress] : no acute distress [Well Nourished] : well nourished [Well Developed] : well developed [Well-Appearing] : well-appearing [Normal Sclera/Conjunctiva] : normal sclera/conjunctiva [PERRL] : pupils equal round and reactive to light [EOMI] : extraocular movements intact [Normal Outer Ear/Nose] : the outer ears and nose were normal in appearance [Normal Oropharynx] : the oropharynx was normal [No JVD] : no jugular venous distention [No Lymphadenopathy] : no lymphadenopathy [Supple] : supple [Thyroid Normal, No Nodules] : the thyroid was normal and there were no nodules present [No Respiratory Distress] : no respiratory distress  [No Accessory Muscle Use] : no accessory muscle use [Clear to Auscultation] : lungs were clear to auscultation bilaterally [Normal Rate] : normal rate  [Regular Rhythm] : with a regular rhythm [Normal S1, S2] : normal S1 and S2 [No Murmur] : no murmur heard [No Carotid Bruits] : no carotid bruits [No Abdominal Bruit] : a ~M bruit was not heard ~T in the abdomen [No Varicosities] : no varicosities [No Edema] : there was no peripheral edema [No Palpable Aorta] : no palpable aorta [Soft] : abdomen soft [Non Tender] : non-tender [Non-distended] : non-distended [No Masses] : no abdominal mass palpated [No HSM] : no HSM [Normal Bowel Sounds] : normal bowel sounds [Normal Posterior Cervical Nodes] : no posterior cervical lymphadenopathy [Normal Anterior Cervical Nodes] : no anterior cervical lymphadenopathy [No CVA Tenderness] : no CVA  tenderness [No Spinal Tenderness] : no spinal tenderness [No Joint Swelling] : no joint swelling [Grossly Normal Strength/Tone] : grossly normal strength/tone [No Rash] : no rash [Coordination Grossly Intact] : coordination grossly intact [No Focal Deficits] : no focal deficits [Normal Gait] : normal gait [Deep Tendon Reflexes (DTR)] : deep tendon reflexes were 2+ and symmetric [Normal Affect] : the affect was normal [Normal Insight/Judgement] : insight and judgment were intact [Normal Voice/Communication] : normal voice/communication [Normal Nasal Mucosa] : the nasal mucosa was normal [Normal TMs] : both tympanic membranes were normal [Normal Supraclavicular Nodes] : no supraclavicular lymphadenopathy [Kyphosis] : no kyphosis [Scoliosis] : no scoliosis [Acne] : no acne [Memory Grossly Normal] : memory grossly normal [Speech Grossly Normal] : speech grossly normal [Alert and Oriented x3] : oriented to person, place, and time [Normal Mood] : the mood was normal

## 2023-04-26 ENCOUNTER — TRANSCRIPTION ENCOUNTER (OUTPATIENT)
Age: 63
End: 2023-04-26

## 2023-04-26 NOTE — ASU PATIENT PROFILE, ADULT - NS PRO AD PATIENT TYPE
PROCEDURE NOTE: Avastin 1.25mg OD. Diagnosis: Choroidal Neovascularization. Anesthesia: Lidocaine 4%. Prep: Betadine Drops. Prior to injection, risks/benefits/alternatives discussed including infection, loss of vision, hemorrhage, cataract, glaucoma, retinal tears or detachment. The off-label status of Intravitreal Avastin also was reviewed. The patient wished to proceed with treatment. Topical anesthesia was induced with Alcaine. Additional anesthesia was achieved using drop(s) or injection checked above. A drop of Povidone-iodine 5% ophthalmic solution was instilled over the injection site and in the inferior fornix. Betadine prep was performed. Using the syringe provided, Avastin 1.25 mg in 0.05 cc was injected into the vitreous cavity. The needle was passed 3.0 mm posterior to the limbus in pseudophakic patients, and 3.5 mm posterior to the limbus in phakic patients. Patient tolerated procedure well. There were no complications. Injection time: 3926 AM. Lot #: [id number]. Expiration Date: 12/1/2022. Post-op instructions given. Inventory Id: null. The patient was instructed to return for re-evaluation in approximately 4-12 weeks depending on his/her condition and was told to call immediately if vision decreases and/or if his/her eye becomes red, painful, and/or light sensitive. The patient was instructed to go to the emergency room or call 911 if unable to reach the doctor within an hour or two of trying or calling. The patient was instructed to use Artificial Tears q.i.d. p.r.n for comfort. Carolina Oseguera
Health Care Proxy (HCP)

## 2023-04-27 ENCOUNTER — OUTPATIENT (OUTPATIENT)
Dept: OUTPATIENT SERVICES | Facility: HOSPITAL | Age: 63
LOS: 1 days | End: 2023-04-27
Payer: COMMERCIAL

## 2023-04-27 ENCOUNTER — TRANSCRIPTION ENCOUNTER (OUTPATIENT)
Age: 63
End: 2023-04-27

## 2023-04-27 VITALS
OXYGEN SATURATION: 96 % | SYSTOLIC BLOOD PRESSURE: 106 MMHG | WEIGHT: 194.01 LBS | DIASTOLIC BLOOD PRESSURE: 69 MMHG | TEMPERATURE: 98 F | HEIGHT: 66 IN | RESPIRATION RATE: 18 BRPM | HEART RATE: 79 BPM

## 2023-04-27 VITALS
RESPIRATION RATE: 11 BRPM | HEART RATE: 64 BPM | OXYGEN SATURATION: 98 % | DIASTOLIC BLOOD PRESSURE: 77 MMHG | SYSTOLIC BLOOD PRESSURE: 108 MMHG

## 2023-04-27 DIAGNOSIS — Z98.890 OTHER SPECIFIED POSTPROCEDURAL STATES: Chronic | ICD-10-CM

## 2023-04-27 DIAGNOSIS — R93.89 ABNORMAL FINDINGS ON DIAGNOSTIC IMAGING OF OTHER SPECIFIED BODY STRUCTURES: ICD-10-CM

## 2023-04-27 LAB — ABO RH CONFIRMATION: SIGNIFICANT CHANGE UP

## 2023-04-27 PROCEDURE — 36415 COLL VENOUS BLD VENIPUNCTURE: CPT

## 2023-04-27 PROCEDURE — 88305 TISSUE EXAM BY PATHOLOGIST: CPT

## 2023-04-27 PROCEDURE — 58558 HYSTEROSCOPY BIOPSY: CPT

## 2023-04-27 PROCEDURE — 82962 GLUCOSE BLOOD TEST: CPT

## 2023-04-27 PROCEDURE — 88305 TISSUE EXAM BY PATHOLOGIST: CPT | Mod: 26

## 2023-04-27 DEVICE — MYOSURE TISSUE REMOVAL DEVICE LITE: Type: IMPLANTABLE DEVICE | Status: FUNCTIONAL

## 2023-04-27 RX ORDER — ROSUVASTATIN CALCIUM 5 MG/1
0 TABLET ORAL
Qty: 0 | Refills: 0 | DISCHARGE
Start: 2023-04-27

## 2023-04-27 RX ORDER — HYDROMORPHONE HYDROCHLORIDE 2 MG/ML
0.5 INJECTION INTRAMUSCULAR; INTRAVENOUS; SUBCUTANEOUS
Refills: 0 | Status: DISCONTINUED | OUTPATIENT
Start: 2023-04-27 | End: 2023-04-27

## 2023-04-27 RX ORDER — ASCORBIC ACID 60 MG
1 TABLET,CHEWABLE ORAL
Qty: 0 | Refills: 0 | DISCHARGE

## 2023-04-27 RX ORDER — GLUCOSAMINE HCL/CHONDROITIN SU 500-400 MG
2 CAPSULE ORAL
Qty: 0 | Refills: 0 | DISCHARGE

## 2023-04-27 RX ORDER — EXEMESTANE 25 MG/1
0 TABLET, SUGAR COATED ORAL
Qty: 0 | Refills: 0 | DISCHARGE
Start: 2023-04-27

## 2023-04-27 RX ORDER — ACETAMINOPHEN 500 MG
2 TABLET ORAL
Qty: 0 | Refills: 0 | DISCHARGE

## 2023-04-27 RX ORDER — CHOLECALCIFEROL (VITAMIN D3) 125 MCG
1 CAPSULE ORAL
Refills: 0 | DISCHARGE

## 2023-04-27 RX ORDER — MULTIVIT-MIN/FERROUS GLUCONATE 9 MG/15 ML
1 LIQUID (ML) ORAL
Qty: 0 | Refills: 0 | DISCHARGE

## 2023-04-27 RX ORDER — OXYCODONE HYDROCHLORIDE 5 MG/1
5 TABLET ORAL ONCE
Refills: 0 | Status: DISCONTINUED | OUTPATIENT
Start: 2023-04-27 | End: 2023-04-27

## 2023-04-27 RX ORDER — MULTIVIT-MIN/FERROUS GLUCONATE 9 MG/15 ML
0 LIQUID (ML) ORAL
Qty: 0 | Refills: 0 | DISCHARGE
Start: 2023-04-27

## 2023-04-27 RX ORDER — EXEMESTANE 25 MG/1
1 TABLET, SUGAR COATED ORAL
Refills: 0 | DISCHARGE

## 2023-04-27 RX ORDER — GLUCOSAMINE HCL/CHONDROITIN SU 500-400 MG
0 CAPSULE ORAL
Qty: 0 | Refills: 0 | DISCHARGE
Start: 2023-04-27

## 2023-04-27 RX ORDER — ACETAMINOPHEN 500 MG
2 TABLET ORAL
Qty: 0 | Refills: 0 | DISCHARGE
Start: 2023-04-27

## 2023-04-27 RX ORDER — ASCORBIC ACID 60 MG
0 TABLET,CHEWABLE ORAL
Qty: 0 | Refills: 0 | DISCHARGE
Start: 2023-04-27

## 2023-04-27 RX ORDER — SODIUM CHLORIDE 9 MG/ML
1000 INJECTION, SOLUTION INTRAVENOUS
Refills: 0 | Status: DISCONTINUED | OUTPATIENT
Start: 2023-04-27 | End: 2023-04-27

## 2023-04-27 RX ORDER — METFORMIN HYDROCHLORIDE 850 MG/1
1 TABLET ORAL
Refills: 0 | DISCHARGE

## 2023-04-27 RX ORDER — CHOLECALCIFEROL (VITAMIN D3) 125 MCG
0 CAPSULE ORAL
Qty: 0 | Refills: 0 | DISCHARGE
Start: 2023-04-27

## 2023-04-27 RX ORDER — METFORMIN HYDROCHLORIDE 850 MG/1
0 TABLET ORAL
Qty: 0 | Refills: 0 | DISCHARGE
Start: 2023-04-27

## 2023-04-27 RX ORDER — ROSUVASTATIN CALCIUM 5 MG/1
1 TABLET ORAL
Refills: 0 | DISCHARGE

## 2023-04-27 RX ORDER — ONDANSETRON 8 MG/1
4 TABLET, FILM COATED ORAL ONCE
Refills: 0 | Status: DISCONTINUED | OUTPATIENT
Start: 2023-04-27 | End: 2023-04-27

## 2023-04-27 RX ADMIN — SODIUM CHLORIDE 75 MILLILITER(S): 9 INJECTION, SOLUTION INTRAVENOUS at 12:35

## 2023-04-27 NOTE — ASU DISCHARGE PLAN (ADULT/PEDIATRIC) - CARE PROVIDER_API CALL
Cipriano Wallis)  Obstetrics and Gynecology  [address] [place] [address]  Phone: [phone]  Fax: [phone]  Established Patient  Follow Up Time:

## 2023-04-27 NOTE — BRIEF OPERATIVE NOTE - NSICDXBRIEFPOSTOP_GEN_ALL_CORE_FT
POST-OP DIAGNOSIS:  Abnormal uterine bleeding due to endometrial polyp 27-Apr-2023 12:10:11  Cipriano Wallis

## 2023-04-27 NOTE — BRIEF OPERATIVE NOTE - COMMENTS
Hysteroscopy demonstrated an endometrial polyp x 2.   Procedure changed to include myosure resection of polyp

## 2023-04-27 NOTE — BRIEF OPERATIVE NOTE - NSICDXBRIEFPREOP_GEN_ALL_CORE_FT
PRE-OP DIAGNOSIS:  Thickened endometrium 27-Apr-2023 12:09:45  Cipriano Wallis  PMB (postmenopausal bleeding) 27-Apr-2023 12:09:53  Cipriano Wallis

## 2023-04-27 NOTE — BRIEF OPERATIVE NOTE - NSICDXBRIEFPROCEDURE_GEN_ALL_CORE_FT
PROCEDURES:  Hysteroscopy, with dilation and curettage 27-Apr-2023 12:09:22  Cipriano Wallis  Hysteroscopic polypectomy using MyoSure tissue removal system 27-Apr-2023 12:09:32  Cipriano Wallis

## 2023-04-28 PROBLEM — G25.0 ESSENTIAL TREMOR: Chronic | Status: ACTIVE | Noted: 2023-04-18

## 2023-04-28 PROBLEM — D05.11 INTRADUCTAL CARCINOMA IN SITU OF RIGHT BREAST: Chronic | Status: ACTIVE | Noted: 2023-04-18

## 2023-05-01 LAB — SURGICAL PATHOLOGY STUDY: SIGNIFICANT CHANGE UP

## 2023-05-15 ENCOUNTER — APPOINTMENT (OUTPATIENT)
Dept: OBGYN | Facility: CLINIC | Age: 63
End: 2023-05-15
Payer: COMMERCIAL

## 2023-05-15 VITALS
SYSTOLIC BLOOD PRESSURE: 126 MMHG | HEART RATE: 103 BPM | DIASTOLIC BLOOD PRESSURE: 82 MMHG | OXYGEN SATURATION: 98 % | HEIGHT: 60 IN | WEIGHT: 193 LBS | BODY MASS INDEX: 37.89 KG/M2

## 2023-05-15 DIAGNOSIS — N84.0 POLYP OF CORPUS UTERI: ICD-10-CM

## 2023-05-15 PROCEDURE — 99212 OFFICE O/P EST SF 10 MIN: CPT

## 2023-05-15 NOTE — PLAN
[None] : None [FreeTextEntry1] : PT is doing well. Pathology reviewed and WNL. \par No activity restrictions and all questions answered. \par \par Polypectomy and myomectomy discussed\par Observe

## 2023-05-15 NOTE — HISTORY OF PRESENT ILLNESS
[Pain is well-controlled] : pain is well-controlled [None] : no vaginal bleeding [Normal] : normal [Pathology reviewed] : pathology reviewed [Fever] : no fever [Chills] : no chills [Nausea] : no nausea [Vomiting] : no vomiting [Diarrhea] : no diarrhea [Vaginal Bleeding] : no vaginal bleeding [Pelvic Pressure] : no pelvic pressure [Dysuria] : no dysuria [Vaginal Discharge] : no vaginal discharge [Constipation] : no constipation [Erythema] : not erythematous [de-identified] : 2 weeks [de-identified] : Hysteroscopy, MyoSure, Polypectomy  [de-identified] : Endometrial thickening, PMB, polyp [de-identified] : Pt presents s/p Hysteroscopy, MyoSure, Polypectomy.  She is doing well.

## 2023-05-16 ENCOUNTER — TRANSCRIPTION ENCOUNTER (OUTPATIENT)
Age: 63
End: 2023-05-16

## 2023-05-26 PROBLEM — C50.912 MALIGNANT NEOPLASM OF UNSPECIFIED SITE OF LEFT FEMALE BREAST: Chronic | Status: ACTIVE | Noted: 2023-04-18

## 2023-05-26 PROBLEM — R73.03 PREDIABETES: Chronic | Status: ACTIVE | Noted: 2023-04-18

## 2023-05-26 PROBLEM — E78.5 HYPERLIPIDEMIA, UNSPECIFIED: Chronic | Status: ACTIVE | Noted: 2023-04-18

## 2023-07-25 ENCOUNTER — APPOINTMENT (OUTPATIENT)
Dept: MAMMOGRAPHY | Facility: CLINIC | Age: 63
End: 2023-07-25
Payer: COMMERCIAL

## 2023-07-25 ENCOUNTER — OUTPATIENT (OUTPATIENT)
Dept: OUTPATIENT SERVICES | Facility: HOSPITAL | Age: 63
LOS: 1 days | End: 2023-07-25
Payer: COMMERCIAL

## 2023-07-25 ENCOUNTER — APPOINTMENT (OUTPATIENT)
Dept: ULTRASOUND IMAGING | Facility: CLINIC | Age: 63
End: 2023-07-25
Payer: COMMERCIAL

## 2023-07-25 DIAGNOSIS — Z98.890 OTHER SPECIFIED POSTPROCEDURAL STATES: Chronic | ICD-10-CM

## 2023-07-25 DIAGNOSIS — Z00.8 ENCOUNTER FOR OTHER GENERAL EXAMINATION: ICD-10-CM

## 2023-07-25 PROCEDURE — 77066 DX MAMMO INCL CAD BI: CPT | Mod: 26

## 2023-07-25 PROCEDURE — G0279: CPT | Mod: 26

## 2023-07-25 PROCEDURE — 77066 DX MAMMO INCL CAD BI: CPT

## 2023-07-25 PROCEDURE — 76641 ULTRASOUND BREAST COMPLETE: CPT

## 2023-07-25 PROCEDURE — 76641 ULTRASOUND BREAST COMPLETE: CPT | Mod: 26,50

## 2023-07-25 PROCEDURE — G0279: CPT

## 2023-08-09 ENCOUNTER — TRANSCRIPTION ENCOUNTER (OUTPATIENT)
Age: 63
End: 2023-08-09

## 2023-09-06 ENCOUNTER — RX RENEWAL (OUTPATIENT)
Age: 63
End: 2023-09-06

## 2023-09-12 ENCOUNTER — APPOINTMENT (OUTPATIENT)
Dept: INTERNAL MEDICINE | Facility: CLINIC | Age: 63
End: 2023-09-12
Payer: COMMERCIAL

## 2023-09-12 VITALS
TEMPERATURE: 97.3 F | OXYGEN SATURATION: 96 % | DIASTOLIC BLOOD PRESSURE: 82 MMHG | HEIGHT: 60 IN | RESPIRATION RATE: 16 BRPM | HEART RATE: 70 BPM | WEIGHT: 190.13 LBS | SYSTOLIC BLOOD PRESSURE: 124 MMHG | BODY MASS INDEX: 37.33 KG/M2

## 2023-09-12 DIAGNOSIS — E55.9 VITAMIN D DEFICIENCY, UNSPECIFIED: ICD-10-CM

## 2023-09-12 DIAGNOSIS — Z79.899 OTHER LONG TERM (CURRENT) DRUG THERAPY: ICD-10-CM

## 2023-09-12 PROCEDURE — 99214 OFFICE O/P EST MOD 30 MIN: CPT | Mod: 25

## 2023-09-12 PROCEDURE — 36415 COLL VENOUS BLD VENIPUNCTURE: CPT

## 2023-09-12 RX ORDER — MELOXICAM 15 MG/1
15 TABLET ORAL
Qty: 90 | Refills: 1 | Status: DISCONTINUED | COMMUNITY
Start: 2022-08-02 | End: 2023-09-12

## 2023-09-13 ENCOUNTER — TRANSCRIPTION ENCOUNTER (OUTPATIENT)
Age: 63
End: 2023-09-13

## 2023-09-13 LAB
25(OH)D3 SERPL-MCNC: 78.2 NG/ML
ALBUMIN SERPL ELPH-MCNC: 4.8 G/DL
ALP BLD-CCNC: 91 U/L
ALT SERPL-CCNC: 16 U/L
ANION GAP SERPL CALC-SCNC: 14 MMOL/L
AST SERPL-CCNC: 18 U/L
BASOPHILS # BLD AUTO: 0.04 K/UL
BASOPHILS NFR BLD AUTO: 0.6 %
BILIRUB SERPL-MCNC: 1.2 MG/DL
BUN SERPL-MCNC: 15 MG/DL
CALCIUM SERPL-MCNC: 10.1 MG/DL
CHLORIDE SERPL-SCNC: 103 MMOL/L
CHOLEST SERPL-MCNC: 126 MG/DL
CO2 SERPL-SCNC: 24 MMOL/L
CREAT SERPL-MCNC: 0.83 MG/DL
EGFR: 80 ML/MIN/1.73M2
EOSINOPHIL # BLD AUTO: 0.24 K/UL
EOSINOPHIL NFR BLD AUTO: 3.4 %
ESTIMATED AVERAGE GLUCOSE: 126 MG/DL
GLUCOSE SERPL-MCNC: 107 MG/DL
HBA1C MFR BLD HPLC: 6 %
HCT VFR BLD CALC: 46.4 %
HDLC SERPL-MCNC: 49 MG/DL
HGB BLD-MCNC: 14.4 G/DL
IMM GRANULOCYTES NFR BLD AUTO: 0.3 %
LDLC SERPL CALC-MCNC: 63 MG/DL
LYMPHOCYTES # BLD AUTO: 1.56 K/UL
LYMPHOCYTES NFR BLD AUTO: 22 %
MAN DIFF?: NORMAL
MCHC RBC-ENTMCNC: 28.7 PG
MCHC RBC-ENTMCNC: 31 GM/DL
MCV RBC AUTO: 92.4 FL
MONOCYTES # BLD AUTO: 0.53 K/UL
MONOCYTES NFR BLD AUTO: 7.5 %
NEUTROPHILS # BLD AUTO: 4.69 K/UL
NEUTROPHILS NFR BLD AUTO: 66.2 %
NONHDLC SERPL-MCNC: 77 MG/DL
PLATELET # BLD AUTO: 285 K/UL
POTASSIUM SERPL-SCNC: 4.2 MMOL/L
PROT SERPL-MCNC: 7.5 G/DL
RBC # BLD: 5.02 M/UL
RBC # FLD: 13.7 %
SODIUM SERPL-SCNC: 141 MMOL/L
TRIGL SERPL-MCNC: 71 MG/DL
WBC # FLD AUTO: 7.08 K/UL

## 2023-09-18 ENCOUNTER — TRANSCRIPTION ENCOUNTER (OUTPATIENT)
Age: 63
End: 2023-09-18

## 2023-10-12 ENCOUNTER — OUTPATIENT (OUTPATIENT)
Dept: OUTPATIENT SERVICES | Facility: HOSPITAL | Age: 63
LOS: 1 days | Discharge: HOME | End: 2023-10-12

## 2023-10-12 DIAGNOSIS — C50.919 MALIGNANT NEOPLASM OF UNSPECIFIED SITE OF UNSPECIFIED FEMALE BREAST: ICD-10-CM

## 2023-10-12 DIAGNOSIS — Z98.890 OTHER SPECIFIED POSTPROCEDURAL STATES: Chronic | ICD-10-CM

## 2023-10-13 ENCOUNTER — APPOINTMENT (OUTPATIENT)
Dept: HEMATOLOGY ONCOLOGY | Facility: CLINIC | Age: 63
End: 2023-10-13
Payer: COMMERCIAL

## 2023-10-13 VITALS
SYSTOLIC BLOOD PRESSURE: 105 MMHG | BODY MASS INDEX: 30.47 KG/M2 | TEMPERATURE: 97.2 F | WEIGHT: 189.58 LBS | DIASTOLIC BLOOD PRESSURE: 72 MMHG | RESPIRATION RATE: 16 BRPM | HEART RATE: 81 BPM | OXYGEN SATURATION: 96 % | HEIGHT: 66 IN

## 2023-10-13 PROCEDURE — 99214 OFFICE O/P EST MOD 30 MIN: CPT

## 2023-10-13 RX ORDER — CITRULL/ARGIN/PINE XT/ROSE HIP 400-400 MG
TABLET ORAL
Refills: 0 | Status: ACTIVE | COMMUNITY
Start: 2023-10-13

## 2023-10-25 ENCOUNTER — APPOINTMENT (OUTPATIENT)
Dept: SURGERY | Facility: CLINIC | Age: 63
End: 2023-10-25
Payer: COMMERCIAL

## 2023-10-25 PROCEDURE — 99213K: CUSTOM

## 2023-11-05 ENCOUNTER — RX RENEWAL (OUTPATIENT)
Age: 63
End: 2023-11-05

## 2023-11-15 ENCOUNTER — TRANSCRIPTION ENCOUNTER (OUTPATIENT)
Age: 63
End: 2023-11-15

## 2023-11-16 ENCOUNTER — TRANSCRIPTION ENCOUNTER (OUTPATIENT)
Age: 63
End: 2023-11-16

## 2023-12-04 ENCOUNTER — OUTPATIENT (OUTPATIENT)
Dept: OUTPATIENT SERVICES | Facility: HOSPITAL | Age: 63
LOS: 1 days | End: 2023-12-04
Payer: COMMERCIAL

## 2023-12-04 ENCOUNTER — APPOINTMENT (OUTPATIENT)
Dept: RADIOLOGY | Facility: CLINIC | Age: 63
End: 2023-12-04
Payer: COMMERCIAL

## 2023-12-04 DIAGNOSIS — C50.911 MALIGNANT NEOPLASM OF UNSPECIFIED SITE OF RIGHT FEMALE BREAST: ICD-10-CM

## 2023-12-04 DIAGNOSIS — Z98.890 OTHER SPECIFIED POSTPROCEDURAL STATES: Chronic | ICD-10-CM

## 2023-12-04 PROCEDURE — 77080 DXA BONE DENSITY AXIAL: CPT

## 2023-12-04 PROCEDURE — 77080 DXA BONE DENSITY AXIAL: CPT | Mod: 26

## 2023-12-30 NOTE — H&P PST ADULT - NS HIV RISK FACTOR NO
Provider at bedside.     Kimberly Zavala RN  12/29/23 [id number]    
FAMILY HISTORY:  No pertinent family history in first degree relatives    
No, Declined

## 2024-01-16 ENCOUNTER — OUTPATIENT (OUTPATIENT)
Dept: OUTPATIENT SERVICES | Facility: HOSPITAL | Age: 64
LOS: 1 days | End: 2024-01-16
Payer: COMMERCIAL

## 2024-01-16 ENCOUNTER — APPOINTMENT (OUTPATIENT)
Dept: MRI IMAGING | Facility: CLINIC | Age: 64
End: 2024-01-16
Payer: COMMERCIAL

## 2024-01-16 DIAGNOSIS — Z00.8 ENCOUNTER FOR OTHER GENERAL EXAMINATION: ICD-10-CM

## 2024-01-16 DIAGNOSIS — Z98.890 OTHER SPECIFIED POSTPROCEDURAL STATES: Chronic | ICD-10-CM

## 2024-01-16 PROCEDURE — 77049 MRI BREAST C-+ W/CAD BI: CPT | Mod: 26

## 2024-01-16 PROCEDURE — C8937: CPT

## 2024-01-16 PROCEDURE — C8908: CPT

## 2024-01-16 PROCEDURE — A9585: CPT

## 2024-01-16 NOTE — ASU DISCHARGE PLAN (ADULT/PEDIATRIC) - NS MD DC FALL RISK RISK
May have all lab work drawn today except cholesterol    Blood pressure not controlled.  Increase lisinopril to 30 mg daily to better optimize blood pressure control    Follow-up in 2 to 4 weeks for blood pressure check    Have ultrasound of the lower legs to rule out blood clot    Schedule heart ultrasound    Consider tetanus booster in the future.    Referral to see dermatologist order placed.  For annual skin exam    Due for repeat colonoscopy anytime after 3/5/2025       For information on Fall & Injury Prevention, visit: [email or web address] OR  [email or web address] OR  https://www.cdc.gov/steadi/patient.html

## 2024-02-05 ENCOUNTER — RX RENEWAL (OUTPATIENT)
Age: 64
End: 2024-02-05

## 2024-02-05 RX ORDER — METFORMIN ER 500 MG 500 MG/1
500 TABLET ORAL
Qty: 180 | Refills: 0 | Status: ACTIVE | COMMUNITY
Start: 2023-02-07 | End: 1900-01-01

## 2024-02-09 RX ORDER — METFORMIN ER 500 MG 500 MG/1
500 TABLET ORAL
Qty: 180 | Refills: 0 | Status: ACTIVE | COMMUNITY
Start: 2023-02-07 | End: 1900-01-01

## 2024-02-15 RX ORDER — EXEMESTANE 25 MG/1
25 TABLET, FILM COATED ORAL DAILY
Qty: 90 | Refills: 1 | Status: ACTIVE | COMMUNITY
Start: 2022-05-26 | End: 1900-01-01

## 2024-04-03 ENCOUNTER — RX RENEWAL (OUTPATIENT)
Age: 64
End: 2024-04-03

## 2024-04-04 RX ORDER — ROSUVASTATIN CALCIUM 5 MG/1
5 TABLET, FILM COATED ORAL DAILY
Qty: 90 | Refills: 0 | Status: ACTIVE | COMMUNITY
Start: 2022-12-15 | End: 1900-01-01

## 2024-04-17 DIAGNOSIS — Z11.3 ENCOUNTER FOR SCREENING FOR INFECTIONS WITH A PREDOMINANTLY SEXUAL MODE OF TRANSMISSION: ICD-10-CM

## 2024-04-17 DIAGNOSIS — Z12.39 ENCOUNTER FOR OTHER SCREENING FOR MALIGNANT NEOPLASM OF BREAST: ICD-10-CM

## 2024-04-17 DIAGNOSIS — Z13.820 ENCOUNTER FOR SCREENING FOR OSTEOPOROSIS: ICD-10-CM

## 2024-04-18 ENCOUNTER — NON-APPOINTMENT (OUTPATIENT)
Age: 64
End: 2024-04-18

## 2024-04-18 ENCOUNTER — OUTPATIENT (OUTPATIENT)
Dept: OUTPATIENT SERVICES | Facility: HOSPITAL | Age: 64
LOS: 1 days | Discharge: HOME | End: 2024-04-18

## 2024-04-18 DIAGNOSIS — Z98.890 OTHER SPECIFIED POSTPROCEDURAL STATES: Chronic | ICD-10-CM

## 2024-04-18 DIAGNOSIS — C50.919 MALIGNANT NEOPLASM OF UNSPECIFIED SITE OF UNSPECIFIED FEMALE BREAST: ICD-10-CM

## 2024-04-19 ENCOUNTER — APPOINTMENT (OUTPATIENT)
Dept: HEMATOLOGY ONCOLOGY | Facility: CLINIC | Age: 64
End: 2024-04-19
Payer: COMMERCIAL

## 2024-04-19 VITALS
DIASTOLIC BLOOD PRESSURE: 79 MMHG | HEART RATE: 80 BPM | TEMPERATURE: 97.7 F | OXYGEN SATURATION: 97 % | RESPIRATION RATE: 16 BRPM | SYSTOLIC BLOOD PRESSURE: 114 MMHG | WEIGHT: 187.99 LBS | BODY MASS INDEX: 30.34 KG/M2

## 2024-04-19 PROCEDURE — G2211 COMPLEX E/M VISIT ADD ON: CPT

## 2024-04-19 PROCEDURE — 99214 OFFICE O/P EST MOD 30 MIN: CPT

## 2024-04-21 NOTE — ASSESSMENT
[FreeTextEntry1] : 64 y/o post-menopausal female diagnosed with left breast stage IA ER/IL+, HER2- cancer as well as right breast DCIS diagnosed 3/2021. She is s/p bilateral lumpectomy and left SLNB. Oncotype Dx recurrence score 20 (~1.6 CT benefit). s/p RT. Here for follow up and is clinically doing well with no evidence of disease.  - Reviewed patient's mammogram from 7/2023 and MRI from 1/2024 - both ANNY - Patient was started on endocrine therapy (Anastrozole) 6/21/21 but is now on exemestane due to side effects and tolerating well; plan for total of 7 years of endocrine therapy - Bone density 12/2023 - with osteopenia Femoral neck -1.1; repeat in 12/2025 - Continue to follow up with Dr. Matias as recommended, last seen 10/25/23 - plan for repeat mammogram in 7/2024 and repeat breast MRI in 1/2025; will be ordered by Dr. Matias - Labs reviewed from 9/2023 - grossly normal; plan for repeat in 5/2024 at time of PMD visit - Patient had the opportunity to have all their questions answered to their satisfaction  - FU in 6 months  Uterine Lining thickening - Per patient's gyn (Dr. Cipriano Wallis) her lining was slightly thickened and she underwent endometrial biopsy in 4/2023 which was negative - continue gyn f/up

## 2024-04-21 NOTE — HISTORY OF PRESENT ILLNESS
[de-identified] : Left IDC, DCIS; Right DCIS [de-identified] : The patient has history of right excisional biopsy for LCIS on R in 2013.    Was having alternating mammograms/US and MRI - delayed due to COVID.  The patient had routine mammogram and sonogram 1/18/21 followed by additional imaging 2/23/21 that showed at 6:00, 8 cm from the nipple a 7 x 5 x 5 mm hypoechoic mass with irregular margins, and containing some microcalcifications. Ultrasound-guided biopsy was recommended.  Left breast ultrasound-guided biopsy was done 3/4/21.  Pathology came back as invasive well differentiated ductal carcinoma.  Delphia score 5/9 (2 + 2 + 1) in this limited material. Invasive tumor measures at least 0.6 cm on the slide.  Rare isolated microcalcifications present in the tumor.   Atypical duct epithelial hyperplasia in one duct. ER positive (90%), WV positive (90%), HER2 negative.  She was sent for breast MRI,  3/16/21 that showed biopsy-proven malignancy in the lower central left breast, measuring 1.2 x 0.9 x 0.9 cm.  7 mm enhancing mass in the lower central right breast. MRI guided biopsy is recommended.  A 4 mm enhancing dermal lesion in the lower outer left breast. Direct inspection is recommended.  BIRADS 4B  MRI-guided biopsy right breast was done 3/18/21 and revealed a complex atypical papillary lesion.  The patient underwent a bilateral lumpectomy and left SLNB with Dr. Matias on 3/30/21.    Final pathology of right lumpectomy: DCIS, cribriform and micropapillary types with low nuclear grade. ER positive (>98%), WV positive (90%).    Left lumpectomy with SLNB final pathology: IDC, moderately differentiated (size: 1.1 cm).  DCIS, solid cribriform and flat types with intermediate nuclear grade and focal microcalcifications.  DCIS shows lobular extensions.  LCIS, classic type. Proliferative fibrocystic change with micro calcifications. All final margins are not involved by invasive carcinoma, distance from closest margin in > 5 mm.  Margin uninvolved by DCIS, distance of nearest margin [inferior margin] =  1.5 mm.  Three lymph nodes, negative for carcinoma (0/3).  Oncotype Dx recurrence score 20, distant recurrence risk at 9 years 6%, CT benefit by age and RS result ~1.6%  She met with Dr. Pickard and completed radiation therapy b/l on June 14, 2021 - no complications/issues related to RT She started adjuvant endocrine therapy in 6/2021 [de-identified] : Left: ER/IL+, HER2-; Right: ER/IL+ [de-identified] : Oncotype Dx recurrence score 20, distant recurrence risk at 9 years 6%, CT benefit by age and RS result ~1.6% [de-identified] : 4/19/24 Patient presents today to assess for evidence of breast cancer recurrence and assess treatment toxicity.  Anastrozole started 6/21/21 however was switched to exemestane (~May 2022) due to severe joint pain on anastrozole and has been tolerating well since the change.  Patient denies any SOB, CP, abdominal pain, bone pain, headache, or unexplained weight loss Patient denies any breast masses,  skin changes or nipple discharge. Patient denies any hotflashes, arthralgias, vaginal dryness, vaginal bleeding, hair loss, muscle cramps.  Headaches have improved Had endometrial bx due to some endometrial thickening in April 2023 with Dr. Cipriano Wallis;  no evidence of malignancy.  mammo/sono 7/25/23 - ANNY MRI Breast 1/2024 - BIRADS 2 Sees Dr. Matias annually Labs done 9/2023 by PMD - reviewed and stable; planned for followup with PMD in 5/2024 and will get repeat labs then   HEALTH MAINTENANCE  PCP Dr. Juan Pablo Marx, on metformin and crestor Bone Density: 6.21 - normal bone mass NEURO essential tremor - extensive workup with neurology negative, has had it since her 20s, maybe slightly worse over time; MRI Head 12/2022 - no evidence of cancer GI Appendectomy 21 years ago, Colonoscopy 2019 - 5 years next (2024) Gyn Dr. Wallis 2019 - minor vaginal spotting - lining thickened - D&C normal. LMP age 52. Used OCPs in the past She has 2 children - son 21, daughter 18.  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

## 2024-04-21 NOTE — PHYSICAL EXAM
[de-identified] : normal conjunctiva, anicteric [de-identified] : b/l lumpectomies, minimal hyperpigmentation secondary to RT , no palpable masses or adenopathy b/l

## 2024-04-23 ENCOUNTER — APPOINTMENT (OUTPATIENT)
Dept: OBGYN | Facility: CLINIC | Age: 64
End: 2024-04-23
Payer: COMMERCIAL

## 2024-04-23 VITALS
WEIGHT: 191 LBS | HEART RATE: 101 BPM | SYSTOLIC BLOOD PRESSURE: 122 MMHG | OXYGEN SATURATION: 97 % | HEIGHT: 66 IN | BODY MASS INDEX: 30.7 KG/M2 | DIASTOLIC BLOOD PRESSURE: 80 MMHG | RESPIRATION RATE: 16 BRPM

## 2024-04-23 DIAGNOSIS — Z01.818 ENCOUNTER FOR OTHER PREPROCEDURAL EXAMINATION: ICD-10-CM

## 2024-04-23 DIAGNOSIS — Z12.39 ENCOUNTER FOR OTHER SCREENING FOR MALIGNANT NEOPLASM OF BREAST: ICD-10-CM

## 2024-04-23 DIAGNOSIS — Z12.4 ENCOUNTER FOR SCREENING FOR MALIGNANT NEOPLASM OF CERVIX: ICD-10-CM

## 2024-04-23 DIAGNOSIS — Z13.31 ENCOUNTER FOR SCREENING FOR DEPRESSION: ICD-10-CM

## 2024-04-23 DIAGNOSIS — Z12.11 ENCOUNTER FOR SCREENING FOR MALIGNANT NEOPLASM OF COLON: ICD-10-CM

## 2024-04-23 DIAGNOSIS — Z01.419 ENCOUNTER FOR GYNECOLOGICAL EXAMINATION (GENERAL) (ROUTINE) W/OUT ABNORMAL FINDINGS: ICD-10-CM

## 2024-04-23 DIAGNOSIS — Z87.898 PERSONAL HISTORY OF OTHER SPECIFIED CONDITIONS: ICD-10-CM

## 2024-04-23 DIAGNOSIS — Z98.890 OTHER SPECIFIED POSTPROCEDURAL STATES: ICD-10-CM

## 2024-04-23 DIAGNOSIS — Z79.811 LONG TERM (CURRENT) USE OF AROMATASE INHIBITORS: ICD-10-CM

## 2024-04-23 PROCEDURE — 82270 OCCULT BLOOD FECES: CPT

## 2024-04-23 PROCEDURE — 99459 PELVIC EXAMINATION: CPT

## 2024-04-23 PROCEDURE — 99396 PREV VISIT EST AGE 40-64: CPT

## 2024-04-23 NOTE — PHYSICAL EXAM
[Chaperone Present] : A chaperone was present in the examining room during all aspects of the physical examination [06020] : A chaperone was present during the pelvic exam. [Appropriately responsive] : appropriately responsive [Alert] : alert [No Acute Distress] : no acute distress [No Lymphadenopathy] : no lymphadenopathy [Soft] : soft [Non-tender] : non-tender [Non-distended] : non-distended [No HSM] : No HSM [No Lesions] : no lesions [No Mass] : no mass [Oriented x3] : oriented x3 [Examination Of The Breasts] : a normal appearance [No Masses] : no breast masses were palpable [Labia Majora] : normal [Labia Minora] : normal [Uterine Prolapse] : uterine prolapse [Uterine Adnexae] : normal [Normal rectal exam] : was normal [Normal Brown Stool] : was normal and brown [Normal] : was normal [None] : there was no rectal mass  [FreeTextEntry2] : Yaquelin Bowen [FreeTextEntry3] : This note was written by Yaquelin Bowen on 04/23/2024, acting as a scribe for Dr. Cipriano Wallis MD. All medic record entries were at my, Dr. Cipriano Wallis MD, direction and personally dictated by me in 04/23/2024. I have personally reviewed the chart and agree that the record accurately reflects my personal performance of the history, physical exam, assessment, and plan.  [Occult Blood Positive] : was negative for occult blood analysis [Internal Hemorrhoid] : no internal hemorrhoids were present [External Hemorrhoid] : no external hemorrhoids were present [Skin Tags] : no residual hemorrhoidal skin tags [FreeTextEntry4] : slight uterine prolapse

## 2024-04-23 NOTE — HISTORY OF PRESENT ILLNESS
[N] : Patient is not sexually active [Y] : Positive pregnancy history [Hot Flashes] : hot flashes [Insomnia] : insomnia [Previously active] : previously active [Men] : men [No] : No [TextBox_4] : The patient presents today for a routine GYN exam. She offers no complaints. We reviewed together in detail her past medical and surgical histories, allergies and medication usage, social and family history. All questions were answered in easy-to-understand language.  The patient is a breast cancer survivor and is doing well.  S/P bilateral lumpectomy.  She is currently receiving endocrine therapy as per Dr. Thao.  S/P RT.2021.  ER/RI +, HER 2 Neg [Mammogramdate] : 07/2023 [BreastSonogramDate] : 07/2023 [PapSmeardate] : 02/2023 [BoneDensityDate] : 12/2023 [ColonoscopyDate] : 01/2020 [[facility] CorporationxTotal] : 57 [[facility]xFullTerm] : 2 [PGHxAbortions] : 3 [[facility]xLiving] : 2 [FreeTextEntry1] : 2 miscarriages

## 2024-04-24 LAB
SOURCE AMPLIFICATION: NORMAL
T VAGINALIS RRNA SPEC QL NAA+PROBE: NOT DETECTED

## 2024-04-26 LAB
CYTOLOGY CVX/VAG DOC THIN PREP: ABNORMAL
HPV 16 E6+E7 MRNA CVX QL NAA+PROBE: NOT DETECTED
HPV18+45 E6+E7 MRNA CVX QL NAA+PROBE: NOT DETECTED

## 2024-04-30 ENCOUNTER — RESULT CHARGE (OUTPATIENT)
Age: 64
End: 2024-04-30

## 2024-05-02 ENCOUNTER — NON-APPOINTMENT (OUTPATIENT)
Age: 64
End: 2024-05-02

## 2024-05-02 ENCOUNTER — APPOINTMENT (OUTPATIENT)
Dept: INTERNAL MEDICINE | Facility: CLINIC | Age: 64
End: 2024-05-02
Payer: COMMERCIAL

## 2024-05-02 VITALS
WEIGHT: 189.19 LBS | TEMPERATURE: 97.3 F | DIASTOLIC BLOOD PRESSURE: 80 MMHG | RESPIRATION RATE: 16 BRPM | BODY MASS INDEX: 30.41 KG/M2 | HEART RATE: 80 BPM | HEIGHT: 66 IN | SYSTOLIC BLOOD PRESSURE: 124 MMHG | OXYGEN SATURATION: 96 %

## 2024-05-02 DIAGNOSIS — R73.09 OTHER ABNORMAL GLUCOSE: ICD-10-CM

## 2024-05-02 DIAGNOSIS — E66.3 OVERWEIGHT: ICD-10-CM

## 2024-05-02 DIAGNOSIS — Z00.00 ENCOUNTER FOR GENERAL ADULT MEDICAL EXAMINATION W/OUT ABNORMAL FINDINGS: ICD-10-CM

## 2024-05-02 DIAGNOSIS — C50.911 MALIGNANT NEOPLASM OF UNSPECIFIED SITE OF RIGHT FEMALE BREAST: ICD-10-CM

## 2024-05-02 DIAGNOSIS — R73.9 HYPERGLYCEMIA, UNSPECIFIED: ICD-10-CM

## 2024-05-02 DIAGNOSIS — E78.5 HYPERLIPIDEMIA, UNSPECIFIED: ICD-10-CM

## 2024-05-02 DIAGNOSIS — I10 ESSENTIAL (PRIMARY) HYPERTENSION: ICD-10-CM

## 2024-05-02 DIAGNOSIS — C50.912 MALIGNANT NEOPLASM OF UNSPECIFIED SITE OF RIGHT FEMALE BREAST: ICD-10-CM

## 2024-05-02 PROCEDURE — 93000 ELECTROCARDIOGRAM COMPLETE: CPT

## 2024-05-02 PROCEDURE — 36415 COLL VENOUS BLD VENIPUNCTURE: CPT

## 2024-05-02 PROCEDURE — 99396 PREV VISIT EST AGE 40-64: CPT

## 2024-05-05 ENCOUNTER — TRANSCRIPTION ENCOUNTER (OUTPATIENT)
Age: 64
End: 2024-05-05

## 2024-05-06 ENCOUNTER — TRANSCRIPTION ENCOUNTER (OUTPATIENT)
Age: 64
End: 2024-05-06

## 2024-05-06 LAB
25(OH)D3 SERPL-MCNC: 58.4 NG/ML
ALBUMIN SERPL ELPH-MCNC: 4.9 G/DL
ALP BLD-CCNC: 88 U/L
ALT SERPL-CCNC: 18 U/L
ANION GAP SERPL CALC-SCNC: 14 MMOL/L
AST SERPL-CCNC: 18 U/L
BASOPHILS # BLD AUTO: 0.03 K/UL
BASOPHILS NFR BLD AUTO: 0.5 %
BILIRUB SERPL-MCNC: 1.2 MG/DL
BUN SERPL-MCNC: 21 MG/DL
CALCIUM SERPL-MCNC: 10.4 MG/DL
CHLORIDE SERPL-SCNC: 104 MMOL/L
CHOLEST SERPL-MCNC: 142 MG/DL
CO2 SERPL-SCNC: 24 MMOL/L
CREAT SERPL-MCNC: 0.94 MG/DL
EGFR: 69 ML/MIN/1.73M2
EOSINOPHIL # BLD AUTO: 0.23 K/UL
EOSINOPHIL NFR BLD AUTO: 3.9 %
ESTIMATED AVERAGE GLUCOSE: 120 MG/DL
GLUCOSE SERPL-MCNC: 103 MG/DL
HBA1C MFR BLD HPLC: 5.8 %
HCT VFR BLD CALC: 47.6 %
HDLC SERPL-MCNC: 51 MG/DL
HGB BLD-MCNC: 14.4 G/DL
IMM GRANULOCYTES NFR BLD AUTO: 0.2 %
LDLC SERPL CALC-MCNC: 77 MG/DL
LYMPHOCYTES # BLD AUTO: 1.58 K/UL
LYMPHOCYTES NFR BLD AUTO: 26.9 %
MAN DIFF?: NORMAL
MCHC RBC-ENTMCNC: 28.5 PG
MCHC RBC-ENTMCNC: 30.3 GM/DL
MCV RBC AUTO: 94.1 FL
MONOCYTES # BLD AUTO: 0.47 K/UL
MONOCYTES NFR BLD AUTO: 8 %
NEUTROPHILS # BLD AUTO: 3.56 K/UL
NEUTROPHILS NFR BLD AUTO: 60.5 %
NONHDLC SERPL-MCNC: 91 MG/DL
PLATELET # BLD AUTO: 284 K/UL
POTASSIUM SERPL-SCNC: 4.4 MMOL/L
PROT SERPL-MCNC: 7.6 G/DL
RBC # BLD: 5.06 M/UL
RBC # FLD: 14.3 %
SODIUM SERPL-SCNC: 142 MMOL/L
TRIGL SERPL-MCNC: 73 MG/DL
TSH SERPL-ACNC: 2.09 UIU/ML
WBC # FLD AUTO: 5.88 K/UL

## 2024-07-29 ENCOUNTER — RESULT REVIEW (OUTPATIENT)
Age: 64
End: 2024-07-29

## 2024-07-29 ENCOUNTER — OUTPATIENT (OUTPATIENT)
Dept: OUTPATIENT SERVICES | Facility: HOSPITAL | Age: 64
LOS: 1 days | End: 2024-07-29
Payer: COMMERCIAL

## 2024-07-29 ENCOUNTER — APPOINTMENT (OUTPATIENT)
Dept: ULTRASOUND IMAGING | Facility: CLINIC | Age: 64
End: 2024-07-29
Payer: COMMERCIAL

## 2024-07-29 ENCOUNTER — APPOINTMENT (OUTPATIENT)
Dept: MAMMOGRAPHY | Facility: CLINIC | Age: 64
End: 2024-07-29
Payer: COMMERCIAL

## 2024-07-29 DIAGNOSIS — Z98.890 OTHER SPECIFIED POSTPROCEDURAL STATES: Chronic | ICD-10-CM

## 2024-07-29 DIAGNOSIS — Z00.00 ENCOUNTER FOR GENERAL ADULT MEDICAL EXAMINATION WITHOUT ABNORMAL FINDINGS: ICD-10-CM

## 2024-07-29 PROCEDURE — 77066 DX MAMMO INCL CAD BI: CPT | Mod: 26

## 2024-07-29 PROCEDURE — 77062 BREAST TOMOSYNTHESIS BI: CPT | Mod: 26

## 2024-07-29 PROCEDURE — 76641 ULTRASOUND BREAST COMPLETE: CPT

## 2024-07-29 PROCEDURE — G0279: CPT | Mod: 26

## 2024-07-29 PROCEDURE — G0279: CPT

## 2024-07-29 PROCEDURE — 76641 ULTRASOUND BREAST COMPLETE: CPT | Mod: 26,50

## 2024-07-29 PROCEDURE — 77066 DX MAMMO INCL CAD BI: CPT

## 2024-09-13 ENCOUNTER — NON-APPOINTMENT (OUTPATIENT)
Age: 64
End: 2024-09-13

## 2024-09-20 ENCOUNTER — TRANSCRIPTION ENCOUNTER (OUTPATIENT)
Age: 64
End: 2024-09-20

## 2024-09-23 ENCOUNTER — TRANSCRIPTION ENCOUNTER (OUTPATIENT)
Age: 64
End: 2024-09-23

## 2024-09-23 ENCOUNTER — RESULT CHARGE (OUTPATIENT)
Age: 64
End: 2024-09-23

## 2024-09-23 NOTE — H&P PST ADULT - [facility]
Pediatric Sick Visit        Subjective   Patient ID: Fara is a 7 year old 2017 female   Accompanied by:Pediatric Historian: mother    Chief Complaint   Patient presents with    Office Visit     Abd pain- 3-8months       Visit Vitals  BP 86/52   Pulse 98   Temp 97.9 °F (36.6 °C) (Temporal)   Resp 24   Ht 3' 10\" (1.168 m)   Wt 20.9 kg (46 lb)   SpO2 99%   BMI 15.28 kg/m²        HISTORY:    Current Medications    No medications on file      Allergies:   Allergies   Allergen Reactions    Eggs Or Egg-Derived Products   (Food Or Med) Other (See Comments)     Tongue swells        Has been complaining of intermittent abdominal pains for ~ 3 months.  Location:  generalized  Frequency: 1-3 times per day  Severity:  moderate - does not \"keel over in pain\", but will at times lay down and stop what she was doing.  Duration  of episodes:  usually resolves within 20-30 minutes  Known triggers:  tends to occur most often when she eats, but does not occur every time she eats (? 50% of time when eats)  Known alleviators:  laying down without any outside  stimulation  Overall, her appetite has been normal, but can be decreased when she has the abdominal pain.  Stools:  1-2 x per day.  Occasionally can be large and hard (~1-2x per week), but other times is soft/normal.  When stools are large and hard, can be painful to pass.  No  symptoms.  Also with significant behavioral issues over the past ~ 6 months.  Has random outbursts of and will try to hurt herself (ie biting, scratching, and hitting herself) and/or hurt others (hit/kick others).  When occurs, can take awhile (at least 1 hour) for her to calm down.  Happening daily at home, but no behavioral issues at school per teachers.  Has also become very anxious about eating healthy foods.        Review of Systems   Constitutional:  Negative for diaphoresis, fatigue, fever and unexpected weight change.   HENT:  Negative for mouth sores and trouble swallowing.    Eyes:   Negative for visual disturbance.   Respiratory:  Negative for cough, shortness of breath, wheezing and stridor.    Cardiovascular:  Negative for chest pain.   Gastrointestinal:  Negative for blood in stool, diarrhea and vomiting.   Endocrine: Negative for cold intolerance, heat intolerance and polydipsia.   Genitourinary:  Negative for dysuria and hematuria.   Musculoskeletal:  Negative for arthralgias, gait problem, joint swelling and myalgias.   Skin:  Negative for pallor and rash.   Neurological:  Negative for dizziness, tremors, seizures, syncope, speech difficulty, weakness, light-headedness, numbness and headaches.   Hematological:  Negative for adenopathy. Does not bruise/bleed easily.       SOCIAL:  Lives at home with parents and sister (3 years old).  Started  in 8/2024.  Mom had miscarriage on 7/2024 and was in and out of the hospital for a few days then.  No other known issues.    Objective   Vitals:BP 86/52   Pulse 98   Temp 97.9 °F (36.6 °C) (Temporal)   Resp 24   Ht 3' 10\" (1.168 m)   Wt 20.9 kg (46 lb)   BMI 15.28 kg/m²   BSA 0.82 m²   Physical Exam  Vitals reviewed. Exam conducted with a chaperone present.   Constitutional:       General: She is active. She is not in acute distress.     Appearance: Normal appearance. She is well-developed. She is not toxic-appearing.   HENT:      Right Ear: Tympanic membrane normal.      Left Ear: Tympanic membrane normal.      Mouth/Throat:      Mouth: Mucous membranes are moist.      Pharynx: Oropharynx is clear. No oropharyngeal exudate or posterior oropharyngeal erythema.      Comments: No oral lesions     Neck: Neck supple.   Eyes:      General:         Right eye: No discharge.         Left eye: No discharge.      Conjunctiva/sclera: Conjunctivae normal.   Neck:      Comments: Normal/mild anterior cervical adenopathy  Cardiovascular:      Rate and Rhythm: Normal rate and regular rhythm.      Heart sounds: Normal heart sounds.   Pulmonary:       Effort: Pulmonary effort is normal. No respiratory distress, nasal flaring or retractions.      Breath sounds: Normal breath sounds. No stridor or decreased air movement. No wheezing, rhonchi or rales.   Abdominal:      General: Abdomen is flat. There is no distension.      Palpations: Abdomen is soft. There is no mass.      Tenderness: There is no abdominal tenderness. There is no guarding or rebound.      Hernia: No hernia is present.      Comments: No hepatosplenomegaly   Genitourinary:     General: Normal vulva.      Vagina: No vaginal discharge.   Musculoskeletal:         General: No swelling, tenderness, deformity or signs of injury. Normal range of motion.      Comments: Joints - WNL   Lymphadenopathy:      Head:      Right side of head: No submental, submandibular, preauricular, posterior auricular or occipital adenopathy.      Left side of head: No submental, submandibular, preauricular, posterior auricular or occipital adenopathy.      Upper Body:      Right upper body: No supraclavicular or axillary adenopathy.      Left upper body: No supraclavicular or axillary adenopathy.      Comments: Minimal/normal anterior cervical and inguinal lymph nodes.   Skin:     General: Skin is warm and dry.      Capillary Refill: Capillary refill takes less than 2 seconds.      Coloration: Skin is not cyanotic, jaundiced or pale.      Findings: No erythema, petechiae or rash.   Neurological:      General: No focal deficit present.      Mental Status: She is alert.   Psychiatric:         Mood and Affect: Mood normal.         ASSESSMENT:  1. Abdominal pain, generalized    2. Behavior disturbance          Normal growth   No \"red flags\" for underlying medical issue as cause of abdominal pain   ? Underlying constipation    PLAN:    In depth discussion with mom.  Discussed beginning w/u versus observation and keeping a diary.  Mom would like to begin w/u at this time.  Check CBC/BMP/LFT's/ESR/CRP/TTG IgA/Total IgA/KUB/U/A  Diary  of symptoms  Psychology evaluation  If all testing is normal - will observe and keep diary and recheck if symptoms persist.  Healthy lifestyle!  If constipated, to consider clean-out with Miralax (discussed with mom, if needed)    ORDERS:  Orders Placed This Encounter    XRAY ABDOMEN 1V    CBC with Automated Differential    Basic Metabolic Panel    Hepatic Function Panel    Sedimentation Rate    C Reactive Protein    Immunoglobulin IgA Quantitative    Tissue Transglutaminase Antibodies IgA    Urinalysis & Reflex Microscopy       No results found for this visit on 09/28/24.     Total time:  45 minutes    Ronn Valentino MD     The patient and parent indicated understanding of the diagnosis and agreed with the plan of care. All questions answered.            2

## 2024-09-24 ENCOUNTER — MED ADMIN CHARGE (OUTPATIENT)
Age: 64
End: 2024-09-24

## 2024-09-24 ENCOUNTER — APPOINTMENT (OUTPATIENT)
Dept: INTERNAL MEDICINE | Facility: CLINIC | Age: 64
End: 2024-09-24
Payer: COMMERCIAL

## 2024-09-24 VITALS
HEART RATE: 74 BPM | HEIGHT: 66 IN | OXYGEN SATURATION: 98 % | DIASTOLIC BLOOD PRESSURE: 70 MMHG | TEMPERATURE: 97.6 F | WEIGHT: 192 LBS | SYSTOLIC BLOOD PRESSURE: 120 MMHG | BODY MASS INDEX: 30.86 KG/M2

## 2024-09-24 DIAGNOSIS — R73.09 OTHER ABNORMAL GLUCOSE: ICD-10-CM

## 2024-09-24 DIAGNOSIS — E78.5 HYPERLIPIDEMIA, UNSPECIFIED: ICD-10-CM

## 2024-09-24 DIAGNOSIS — Z23 ENCOUNTER FOR IMMUNIZATION: ICD-10-CM

## 2024-09-24 DIAGNOSIS — R82.998 OTHER ABNORMAL FINDINGS IN URINE: ICD-10-CM

## 2024-09-24 DIAGNOSIS — I10 ESSENTIAL (PRIMARY) HYPERTENSION: ICD-10-CM

## 2024-09-24 LAB
BILIRUB UR QL STRIP: NEGATIVE
CLARITY UR: CLEAR
COLLECTION METHOD: NORMAL
GLUCOSE UR-MCNC: NEGATIVE
HCG UR QL: 0.2 EU/DL
HGB UR QL STRIP.AUTO: ABNORMAL
KETONES UR-MCNC: NEGATIVE
LEUKOCYTE ESTERASE UR QL STRIP: ABNORMAL
NITRITE UR QL STRIP: NEGATIVE
PH UR STRIP: 7
PROT UR STRIP-MCNC: NEGATIVE
SP GR UR STRIP: 1.01

## 2024-09-24 PROCEDURE — 81003 URINALYSIS AUTO W/O SCOPE: CPT | Mod: QW

## 2024-09-24 PROCEDURE — 99214 OFFICE O/P EST MOD 30 MIN: CPT | Mod: 25

## 2024-09-24 PROCEDURE — G0008: CPT

## 2024-09-24 PROCEDURE — 36415 COLL VENOUS BLD VENIPUNCTURE: CPT

## 2024-09-24 PROCEDURE — 90656 IIV3 VACC NO PRSV 0.5 ML IM: CPT

## 2024-09-24 NOTE — HISTORY OF PRESENT ILLNESS
[FreeTextEntry1] : Blood pressure check and fasting blood work. [de-identified] : Patient is a 64-year-old female with a past medical history as below who presents for Blood pressure check and fasting blood work.  Patient states she is taking her medication on a daily basis without any side effects.   PT denies seeing cardio. Denies having CP, SOB, FUNG.

## 2024-09-24 NOTE — HEALTH RISK ASSESSMENT
[No] : In the past 12 months have you used drugs other than those required for medical reasons? No [0] : 2) Feeling down, depressed, or hopeless: Not at all (0) [PHQ-2 Negative - No further assessment needed] : PHQ-2 Negative - No further assessment needed [Never] : Never [With Family] : lives with family [Retired] : retired [] :  [Audit-CScore] : 0 [FDW9Lepcc] : 0 [MammogramComments] : Results of most recent breast imaging to be requested from [facility] in [place]. [BoneDensityDate] : 06/21 [BoneDensityComments] : Normal bone mass. [ColonoscopyDate] : 12/19

## 2024-09-24 NOTE — REVIEW OF SYSTEMS
[Fatigue] : fatigue [Joint Pain] : joint pain [Headache] : headache [Negative] : Heme/Lymph [Fever] : no fever [Chills] : no chills [Discharge] : no discharge [Itching] : no itching [Earache] : no earache [Hearing Loss] : no hearing loss [Nasal Discharge] : no nasal discharge [Sore Throat] : no sore throat [Chest Pain] : no chest pain [Palpitations] : no palpitations [Leg Claudication] : no leg claudication [Lower Ext Edema] : no lower extremity edema [Abdominal Pain] : no abdominal pain [Nausea] : no nausea [Vomiting] : no vomiting [Heartburn] : no heartburn [Dysuria] : no dysuria [Incontinence] : no incontinence [Hematuria] : no hematuria [Frequency] : no frequency [Muscle Pain] : no muscle pain [Itching] : no itching [Mole Changes] : no mole changes [Skin Rash] : no skin rash [Dizziness] : no dizziness [Memory Loss] : no memory loss [Unsteady Walking] : no ataxia [Insomnia] : no insomnia [Easy Bruising] : no easy bruising

## 2024-09-24 NOTE — PLAN
[FreeTextEntry1] : Cardiology Hypertension - advised patient to Continue with ramipril 5 mg daily.  discussed side effects.  Advise low-sodium diet.  encouraged Weight loss.  Advised patient to follow-up with cardiology for noninvasive cardiac testing secondary to family history of coronary artery disease - Also to get an Echocardiogram.  history of hyperlipidemia - continue low cholesterol/low fat diet - monitor LFTs.  Continue Crestor 5 mg daily  Endocrinology hyperglycemia - continue low carbohydrate/low sugar diet and CV exercise - check hemoglobin A1C  history of obesity/overweight - continue low carbohydrate diet and CV exercise - Advised 1800 ADA diet  history of Vitamin D deficiency - continue Vitamin D-3 p.o. with a meal as directed - Refilled metformin.   HX thyroid cyst - Check TSH  Gynecology   Advised patient follow-up with gynecology once a year for clinical breast exam and routine Pap smear.  Oncology history of breast cancer - s/p excisional biopsy R LCIS in 2013; s/p bilateral lumpectomy and left SLNB on 3/30/21; s/p radiation therapy - continue Exemestane 25mg p.o.q.d. as directed - continue to follow up with oncologist, Dr. Segura and breast surgeon, Dr. Matias.  Immunization - Flu vaccination discussed. Education provided -  Fluzone 0.5 mL administered intramuscularly in the deltoid area.  See consent form for lot number and expiration date.  Administration of vaccine was given by Adriana Lockhart registered nurse.   I spent 36 Minutes with the patient, half of which we discussed finding on physical exam  and coordinated care.  As well as reviewed my plans and follow ups.

## 2024-09-24 NOTE — ASSESSMENT
[FreeTextEntry1] : Ms. FELDER is a 64 year old female, with a past medical history as noted above, who present to the office today for fasting labs, Flu shot, medication renewal

## 2024-09-26 ENCOUNTER — TRANSCRIPTION ENCOUNTER (OUTPATIENT)
Age: 64
End: 2024-09-26

## 2024-09-27 LAB
25(OH)D3 SERPL-MCNC: 60.6 NG/ML
ALBUMIN SERPL ELPH-MCNC: 4.8 G/DL
ALP BLD-CCNC: 89 U/L
ALT SERPL-CCNC: 16 U/L
ANION GAP SERPL CALC-SCNC: 15 MMOL/L
AST SERPL-CCNC: 17 U/L
BACTERIA UR CULT: NORMAL
BASOPHILS # BLD AUTO: 0.04 K/UL
BASOPHILS NFR BLD AUTO: 0.7 %
BILIRUB SERPL-MCNC: 1.3 MG/DL
BUN SERPL-MCNC: 18 MG/DL
CALCIUM SERPL-MCNC: 10.3 MG/DL
CHLORIDE SERPL-SCNC: 102 MMOL/L
CHOLEST SERPL-MCNC: 155 MG/DL
CO2 SERPL-SCNC: 24 MMOL/L
CREAT SERPL-MCNC: 0.85 MG/DL
EGFR: 77 ML/MIN/1.73M2
EOSINOPHIL # BLD AUTO: 0.27 K/UL
EOSINOPHIL NFR BLD AUTO: 4.5 %
ESTIMATED AVERAGE GLUCOSE: 120 MG/DL
GLUCOSE SERPL-MCNC: 120 MG/DL
HBA1C MFR BLD HPLC: 5.8 %
HCT VFR BLD CALC: 48.2 %
HDLC SERPL-MCNC: 52 MG/DL
HGB BLD-MCNC: 14.8 G/DL
IMM GRANULOCYTES NFR BLD AUTO: 0.2 %
LDLC SERPL CALC-MCNC: 85 MG/DL
LYMPHOCYTES # BLD AUTO: 1.54 K/UL
LYMPHOCYTES NFR BLD AUTO: 25.8 %
MAN DIFF?: NORMAL
MCHC RBC-ENTMCNC: 28.1 PG
MCHC RBC-ENTMCNC: 30.7 GM/DL
MCV RBC AUTO: 91.5 FL
MONOCYTES # BLD AUTO: 0.42 K/UL
MONOCYTES NFR BLD AUTO: 7 %
NEUTROPHILS # BLD AUTO: 3.69 K/UL
NEUTROPHILS NFR BLD AUTO: 61.8 %
NONHDLC SERPL-MCNC: 103 MG/DL
PLATELET # BLD AUTO: 301 K/UL
POTASSIUM SERPL-SCNC: 4.4 MMOL/L
PROT SERPL-MCNC: 7.5 G/DL
RBC # BLD: 5.27 M/UL
RBC # FLD: 13.7 %
SODIUM SERPL-SCNC: 142 MMOL/L
TRIGL SERPL-MCNC: 98 MG/DL
TSH SERPL-ACNC: 1.71 UIU/ML
WBC # FLD AUTO: 5.97 K/UL

## 2024-10-04 ENCOUNTER — TRANSCRIPTION ENCOUNTER (OUTPATIENT)
Age: 64
End: 2024-10-04

## 2024-10-07 ENCOUNTER — OUTPATIENT (OUTPATIENT)
Dept: OUTPATIENT SERVICES | Facility: HOSPITAL | Age: 64
LOS: 1 days | Discharge: HOME | End: 2024-10-07

## 2024-10-07 DIAGNOSIS — Z98.890 OTHER SPECIFIED POSTPROCEDURAL STATES: Chronic | ICD-10-CM

## 2024-10-07 DIAGNOSIS — C50.919 MALIGNANT NEOPLASM OF UNSPECIFIED SITE OF UNSPECIFIED FEMALE BREAST: ICD-10-CM

## 2024-10-08 ENCOUNTER — TRANSCRIPTION ENCOUNTER (OUTPATIENT)
Age: 64
End: 2024-10-08

## 2024-10-09 ENCOUNTER — TRANSCRIPTION ENCOUNTER (OUTPATIENT)
Age: 64
End: 2024-10-09

## 2024-10-18 ENCOUNTER — APPOINTMENT (OUTPATIENT)
Dept: HEMATOLOGY ONCOLOGY | Facility: CLINIC | Age: 64
End: 2024-10-18
Payer: COMMERCIAL

## 2024-10-18 VITALS
SYSTOLIC BLOOD PRESSURE: 113 MMHG | OXYGEN SATURATION: 99 % | TEMPERATURE: 97.5 F | WEIGHT: 189 LBS | DIASTOLIC BLOOD PRESSURE: 74 MMHG | HEART RATE: 77 BPM | BODY MASS INDEX: 30.51 KG/M2 | RESPIRATION RATE: 16 BRPM

## 2024-10-18 DIAGNOSIS — C50.912 MALIGNANT NEOPLASM OF UNSPECIFIED SITE OF RIGHT FEMALE BREAST: ICD-10-CM

## 2024-10-18 DIAGNOSIS — C50.911 MALIGNANT NEOPLASM OF UNSPECIFIED SITE OF RIGHT FEMALE BREAST: ICD-10-CM

## 2024-10-18 DIAGNOSIS — Z79.811 LONG TERM (CURRENT) USE OF AROMATASE INHIBITORS: ICD-10-CM

## 2024-10-18 PROCEDURE — 99214 OFFICE O/P EST MOD 30 MIN: CPT

## 2024-10-18 PROCEDURE — G2211 COMPLEX E/M VISIT ADD ON: CPT | Mod: NC

## 2024-10-21 ENCOUNTER — APPOINTMENT (OUTPATIENT)
Dept: SURGERY | Facility: CLINIC | Age: 64
End: 2024-10-21
Payer: COMMERCIAL

## 2024-10-21 PROCEDURE — 99213K: CUSTOM

## 2024-11-20 ENCOUNTER — NON-APPOINTMENT (OUTPATIENT)
Age: 64
End: 2024-11-20

## 2024-11-20 ENCOUNTER — APPOINTMENT (OUTPATIENT)
Dept: CARDIOLOGY | Facility: CLINIC | Age: 64
End: 2024-11-20
Payer: COMMERCIAL

## 2024-11-20 VITALS
HEIGHT: 66 IN | BODY MASS INDEX: 30.37 KG/M2 | SYSTOLIC BLOOD PRESSURE: 122 MMHG | OXYGEN SATURATION: 92 % | DIASTOLIC BLOOD PRESSURE: 84 MMHG | HEART RATE: 97 BPM | WEIGHT: 189 LBS

## 2024-11-20 DIAGNOSIS — E78.5 HYPERLIPIDEMIA, UNSPECIFIED: ICD-10-CM

## 2024-11-20 DIAGNOSIS — R01.1 CARDIAC MURMUR, UNSPECIFIED: ICD-10-CM

## 2024-11-20 DIAGNOSIS — I10 ESSENTIAL (PRIMARY) HYPERTENSION: ICD-10-CM

## 2024-11-20 DIAGNOSIS — R73.03 PREDIABETES.: ICD-10-CM

## 2024-11-20 PROCEDURE — 99204 OFFICE O/P NEW MOD 45 MIN: CPT

## 2024-11-20 PROCEDURE — 93000 ELECTROCARDIOGRAM COMPLETE: CPT

## 2024-11-20 PROCEDURE — G2211 COMPLEX E/M VISIT ADD ON: CPT | Mod: NC

## 2024-11-26 ENCOUNTER — APPOINTMENT (OUTPATIENT)
Dept: CARDIOLOGY | Facility: CLINIC | Age: 64
End: 2024-11-26
Payer: COMMERCIAL

## 2024-11-26 ENCOUNTER — MED ADMIN CHARGE (OUTPATIENT)
Age: 64
End: 2024-11-26

## 2024-11-26 PROCEDURE — 93306 TTE W/DOPPLER COMPLETE: CPT

## 2024-11-26 RX ORDER — PERFLUTREN 6.52 MG/ML
6.52 INJECTION, SUSPENSION INTRAVENOUS
Qty: 2 | Refills: 0 | Status: COMPLETED | OUTPATIENT
Start: 2024-11-26

## 2024-11-26 RX ADMIN — PERFLUTREN MG/ML: 6.52 INJECTION, SUSPENSION INTRAVENOUS at 00:00

## 2024-12-07 ENCOUNTER — APPOINTMENT (OUTPATIENT)
Dept: CT IMAGING | Facility: CLINIC | Age: 64
End: 2024-12-07
Payer: SELF-PAY

## 2024-12-07 PROCEDURE — 75571 CT HRT W/O DYE W/CA TEST: CPT

## 2024-12-24 ENCOUNTER — RX RENEWAL (OUTPATIENT)
Age: 64
End: 2024-12-24

## 2025-01-03 ENCOUNTER — APPOINTMENT (OUTPATIENT)
Dept: INTERNAL MEDICINE | Facility: CLINIC | Age: 65
End: 2025-01-03
Payer: COMMERCIAL

## 2025-01-03 ENCOUNTER — RESULT CHARGE (OUTPATIENT)
Age: 65
End: 2025-01-03

## 2025-01-03 VITALS
SYSTOLIC BLOOD PRESSURE: 120 MMHG | HEART RATE: 77 BPM | HEIGHT: 66 IN | RESPIRATION RATE: 16 BRPM | BODY MASS INDEX: 30.7 KG/M2 | WEIGHT: 191 LBS | DIASTOLIC BLOOD PRESSURE: 72 MMHG | OXYGEN SATURATION: 97 % | TEMPERATURE: 98.1 F

## 2025-01-03 DIAGNOSIS — R91.8 OTHER NONSPECIFIC ABNORMAL FINDING OF LUNG FIELD: ICD-10-CM

## 2025-01-03 DIAGNOSIS — R73.03 PREDIABETES.: ICD-10-CM

## 2025-01-03 DIAGNOSIS — R82.998 OTHER ABNORMAL FINDINGS IN URINE: ICD-10-CM

## 2025-01-03 DIAGNOSIS — I10 ESSENTIAL (PRIMARY) HYPERTENSION: ICD-10-CM

## 2025-01-03 DIAGNOSIS — R25.1 TREMOR, UNSPECIFIED: ICD-10-CM

## 2025-01-03 DIAGNOSIS — R73.09 OTHER ABNORMAL GLUCOSE: ICD-10-CM

## 2025-01-03 LAB
BILIRUB UR QL STRIP: NORMAL
CLARITY UR: CLEAR
COLLECTION METHOD: NORMAL
GLUCOSE UR-MCNC: NORMAL
HCG UR QL: 0.2 EU/DL
HGB UR QL STRIP.AUTO: ABNORMAL
KETONES UR-MCNC: NORMAL
LEUKOCYTE ESTERASE UR QL STRIP: ABNORMAL
NITRITE UR QL STRIP: NORMAL
PH UR STRIP: 6.5
PROT UR STRIP-MCNC: NORMAL
SP GR UR STRIP: 1.02

## 2025-01-03 PROCEDURE — 99215 OFFICE O/P EST HI 40 MIN: CPT

## 2025-01-03 PROCEDURE — 36415 COLL VENOUS BLD VENIPUNCTURE: CPT

## 2025-01-03 PROCEDURE — G2211 COMPLEX E/M VISIT ADD ON: CPT | Mod: NC

## 2025-01-05 LAB
ALBUMIN SERPL ELPH-MCNC: 4.6 G/DL
ALP BLD-CCNC: 92 U/L
ALT SERPL-CCNC: 20 U/L
ANION GAP SERPL CALC-SCNC: 15 MMOL/L
AST SERPL-CCNC: 16 U/L
BACTERIA UR CULT: NORMAL
BASOPHILS # BLD AUTO: 0.06 K/UL
BASOPHILS NFR BLD AUTO: 0.9 %
BILIRUB SERPL-MCNC: 1.1 MG/DL
BUN SERPL-MCNC: 16 MG/DL
CALCIUM SERPL-MCNC: 10.2 MG/DL
CHLORIDE SERPL-SCNC: 101 MMOL/L
CHOLEST SERPL-MCNC: 123 MG/DL
CO2 SERPL-SCNC: 25 MMOL/L
CREAT SERPL-MCNC: 0.87 MG/DL
EGFR: 75 ML/MIN/1.73M2
EOSINOPHIL # BLD AUTO: 0.31 K/UL
EOSINOPHIL NFR BLD AUTO: 4.9 %
ESTIMATED AVERAGE GLUCOSE: 128 MG/DL
GLUCOSE SERPL-MCNC: 108 MG/DL
HBA1C MFR BLD HPLC: 6.1 %
HCT VFR BLD CALC: 45.5 %
HDLC SERPL-MCNC: 46 MG/DL
HGB BLD-MCNC: 14.4 G/DL
IMM GRANULOCYTES NFR BLD AUTO: 0.2 %
IRON SERPL-MCNC: 99 UG/DL
LDLC SERPL CALC-MCNC: 61 MG/DL
LYMPHOCYTES # BLD AUTO: 1.75 K/UL
LYMPHOCYTES NFR BLD AUTO: 27.5 %
MAN DIFF?: NORMAL
MCHC RBC-ENTMCNC: 28.1 PG
MCHC RBC-ENTMCNC: 31.6 G/DL
MCV RBC AUTO: 88.9 FL
MONOCYTES # BLD AUTO: 0.52 K/UL
MONOCYTES NFR BLD AUTO: 8.2 %
NEUTROPHILS # BLD AUTO: 3.71 K/UL
NEUTROPHILS NFR BLD AUTO: 58.3 %
NONHDLC SERPL-MCNC: 78 MG/DL
PLATELET # BLD AUTO: 295 K/UL
POTASSIUM SERPL-SCNC: 4.4 MMOL/L
PROT SERPL-MCNC: 7.5 G/DL
RBC # BLD: 5.12 M/UL
RBC # FLD: 13.8 %
SODIUM SERPL-SCNC: 141 MMOL/L
TRIGL SERPL-MCNC: 88 MG/DL
TSH SERPL-ACNC: 1.83 UIU/ML
VIT B12 SERPL-MCNC: 1947 PG/ML
WBC # FLD AUTO: 6.36 K/UL

## 2025-01-09 ENCOUNTER — OUTPATIENT (OUTPATIENT)
Dept: OUTPATIENT SERVICES | Facility: HOSPITAL | Age: 65
LOS: 1 days | End: 2025-01-09
Payer: COMMERCIAL

## 2025-01-09 ENCOUNTER — APPOINTMENT (OUTPATIENT)
Dept: MRI IMAGING | Facility: CLINIC | Age: 65
End: 2025-01-09
Payer: COMMERCIAL

## 2025-01-09 DIAGNOSIS — Z98.890 OTHER SPECIFIED POSTPROCEDURAL STATES: Chronic | ICD-10-CM

## 2025-01-09 DIAGNOSIS — Z00.8 ENCOUNTER FOR OTHER GENERAL EXAMINATION: ICD-10-CM

## 2025-01-09 PROCEDURE — A9585: CPT

## 2025-01-09 PROCEDURE — 77049 MRI BREAST C-+ W/CAD BI: CPT | Mod: 26

## 2025-01-09 PROCEDURE — C8937: CPT

## 2025-01-09 PROCEDURE — C8908: CPT

## 2025-01-16 ENCOUNTER — APPOINTMENT (OUTPATIENT)
Dept: PULMONOLOGY | Facility: CLINIC | Age: 65
End: 2025-01-16
Payer: COMMERCIAL

## 2025-01-16 VITALS
DIASTOLIC BLOOD PRESSURE: 79 MMHG | OXYGEN SATURATION: 95 % | BODY MASS INDEX: 30.37 KG/M2 | WEIGHT: 189 LBS | HEART RATE: 72 BPM | SYSTOLIC BLOOD PRESSURE: 126 MMHG | HEIGHT: 66 IN

## 2025-01-16 DIAGNOSIS — R91.8 OTHER NONSPECIFIC ABNORMAL FINDING OF LUNG FIELD: ICD-10-CM

## 2025-01-16 DIAGNOSIS — R06.83 SNORING: ICD-10-CM

## 2025-01-16 DIAGNOSIS — J45.909 UNSPECIFIED ASTHMA, UNCOMPLICATED: ICD-10-CM

## 2025-01-16 PROCEDURE — 99204 OFFICE O/P NEW MOD 45 MIN: CPT | Mod: 25

## 2025-01-16 PROCEDURE — 94726 PLETHYSMOGRAPHY LUNG VOLUMES: CPT

## 2025-01-16 PROCEDURE — 94010 BREATHING CAPACITY TEST: CPT

## 2025-01-16 PROCEDURE — ZZZZZ: CPT

## 2025-01-16 PROCEDURE — 94729 DIFFUSING CAPACITY: CPT

## 2025-01-27 ENCOUNTER — TRANSCRIPTION ENCOUNTER (OUTPATIENT)
Age: 65
End: 2025-01-27

## 2025-01-28 ENCOUNTER — TRANSCRIPTION ENCOUNTER (OUTPATIENT)
Age: 65
End: 2025-01-28

## 2025-01-31 ENCOUNTER — APPOINTMENT (OUTPATIENT)
Dept: PULMONOLOGY | Facility: CLINIC | Age: 65
End: 2025-01-31
Payer: COMMERCIAL

## 2025-01-31 PROCEDURE — ZZZZZ: CPT

## 2025-02-01 PROCEDURE — 95800 SLP STDY UNATTENDED: CPT

## 2025-02-02 PROCEDURE — 95800 SLP STDY UNATTENDED: CPT

## 2025-02-06 NOTE — PHYSICAL EXAM
Per Hepatology    The provider that she's currently scheduled on 5/26/25 with is our NP and he's currently out on Paternity leave until the end of April and that is his first available as well.    [No Acute Distress] : no acute distress [Well Nourished] : well nourished [Well Developed] : well developed [Well-Appearing] : well-appearing [Normal Voice/Communication] : normal voice/communication [Normal Sclera/Conjunctiva] : normal sclera/conjunctiva [PERRL] : pupils equal round and reactive to light [EOMI] : extraocular movements intact [Normal Outer Ear/Nose] : the outer ears and nose were normal in appearance [Normal Oropharynx] : the oropharynx was normal [Normal TMs] : both tympanic membranes were normal [No JVD] : no jugular venous distention [No Lymphadenopathy] : no lymphadenopathy [Supple] : supple [No Respiratory Distress] : no respiratory distress  [No Accessory Muscle Use] : no accessory muscle use [Clear to Auscultation] : lungs were clear to auscultation bilaterally [Normal Rate] : normal rate  [Regular Rhythm] : with a regular rhythm [Normal S1, S2] : normal S1 and S2 [No Carotid Bruits] : no carotid bruits [No Abdominal Bruit] : a ~M bruit was not heard ~T in the abdomen [Pedal Pulses Present] : the pedal pulses are present [No Edema] : there was no peripheral edema [No Palpable Aorta] : no palpable aorta [No Extremity Clubbing/Cyanosis] : no extremity clubbing/cyanosis [Soft] : abdomen soft [Non Tender] : non-tender [Non-distended] : non-distended [No Masses] : no abdominal mass palpated [No HSM] : no HSM [Normal Bowel Sounds] : normal bowel sounds [Normal Supraclavicular Nodes] : no supraclavicular lymphadenopathy [Normal Posterior Cervical Nodes] : no posterior cervical lymphadenopathy [Normal Anterior Cervical Nodes] : no anterior cervical lymphadenopathy [No CVA Tenderness] : no CVA  tenderness [No Spinal Tenderness] : no spinal tenderness [No Joint Swelling] : no joint swelling [Grossly Normal Strength/Tone] : grossly normal strength/tone [No Rash] : no rash [Coordination Grossly Intact] : coordination grossly intact [No Focal Deficits] : no focal deficits [Normal Gait] : normal gait [Deep Tendon Reflexes (DTR)] : deep tendon reflexes were 2+ and symmetric [Speech Grossly Normal] : speech grossly normal [Memory Grossly Normal] : memory grossly normal [Normal Affect] : the affect was normal [Alert and Oriented x3] : oriented to person, place, and time [Normal Mood] : the mood was normal [Normal Insight/Judgement] : insight and judgment were intact [Kyphosis] : no kyphosis [Scoliosis] : no scoliosis [Acne] : no acne [de-identified] :  thyroid fullness [de-identified] : w/ murmur ? click [de-identified] : done by breast surgeon/GYN [de-identified] : obese [de-identified] :  as per gynecology [de-identified] :  slight tremor noted- head tremor noted

## 2025-02-07 ENCOUNTER — TRANSCRIPTION ENCOUNTER (OUTPATIENT)
Age: 65
End: 2025-02-07

## 2025-02-18 ENCOUNTER — APPOINTMENT (OUTPATIENT)
Dept: NEUROLOGY | Facility: CLINIC | Age: 65
End: 2025-02-18
Payer: COMMERCIAL

## 2025-02-18 VITALS
HEIGHT: 66 IN | DIASTOLIC BLOOD PRESSURE: 85 MMHG | BODY MASS INDEX: 30.22 KG/M2 | HEART RATE: 64 BPM | WEIGHT: 188 LBS | SYSTOLIC BLOOD PRESSURE: 129 MMHG

## 2025-02-18 DIAGNOSIS — R25.1 TREMOR, UNSPECIFIED: ICD-10-CM

## 2025-02-18 PROCEDURE — 99215 OFFICE O/P EST HI 40 MIN: CPT

## 2025-02-18 PROCEDURE — 99205 OFFICE O/P NEW HI 60 MIN: CPT

## 2025-02-18 RX ORDER — PRIMIDONE 50 MG/1
50 TABLET ORAL
Qty: 30 | Refills: 3 | Status: ACTIVE | COMMUNITY
Start: 2025-02-18 | End: 1900-01-01

## 2025-03-05 ENCOUNTER — TRANSCRIPTION ENCOUNTER (OUTPATIENT)
Age: 65
End: 2025-03-05

## 2025-03-06 ENCOUNTER — APPOINTMENT (OUTPATIENT)
Dept: PULMONOLOGY | Facility: CLINIC | Age: 65
End: 2025-03-06
Payer: COMMERCIAL

## 2025-03-06 DIAGNOSIS — G47.33 OBSTRUCTIVE SLEEP APNEA (ADULT) (PEDIATRIC): ICD-10-CM

## 2025-03-06 PROCEDURE — 99213 OFFICE O/P EST LOW 20 MIN: CPT | Mod: 95

## 2025-03-26 ENCOUNTER — NON-APPOINTMENT (OUTPATIENT)
Age: 65
End: 2025-03-26

## 2025-03-28 ENCOUNTER — OUTPATIENT (OUTPATIENT)
Dept: OUTPATIENT SERVICES | Facility: HOSPITAL | Age: 65
LOS: 1 days | Discharge: HOME | End: 2025-03-28

## 2025-03-28 DIAGNOSIS — Z98.890 OTHER SPECIFIED POSTPROCEDURAL STATES: Chronic | ICD-10-CM

## 2025-03-29 ENCOUNTER — NON-APPOINTMENT (OUTPATIENT)
Age: 65
End: 2025-03-29

## 2025-03-31 ENCOUNTER — APPOINTMENT (OUTPATIENT)
Dept: HEMATOLOGY ONCOLOGY | Facility: CLINIC | Age: 65
End: 2025-03-31
Payer: COMMERCIAL

## 2025-03-31 VITALS
WEIGHT: 187.39 LBS | SYSTOLIC BLOOD PRESSURE: 110 MMHG | OXYGEN SATURATION: 94 % | TEMPERATURE: 98.5 F | HEART RATE: 65 BPM | RESPIRATION RATE: 16 BRPM | DIASTOLIC BLOOD PRESSURE: 77 MMHG | HEIGHT: 65.55 IN | BODY MASS INDEX: 30.85 KG/M2

## 2025-03-31 DIAGNOSIS — C50.912 MALIGNANT NEOPLASM OF UNSPECIFIED SITE OF RIGHT FEMALE BREAST: ICD-10-CM

## 2025-03-31 DIAGNOSIS — C50.911 MALIGNANT NEOPLASM OF UNSPECIFIED SITE OF RIGHT FEMALE BREAST: ICD-10-CM

## 2025-03-31 PROCEDURE — 99214 OFFICE O/P EST MOD 30 MIN: CPT

## 2025-04-06 PROBLEM — E53.8 VITAMIN B12 DEFICIENCY: Status: ACTIVE | Noted: 2025-04-06

## 2025-04-07 ENCOUNTER — APPOINTMENT (OUTPATIENT)
Dept: INTERNAL MEDICINE | Facility: CLINIC | Age: 65
End: 2025-04-07
Payer: COMMERCIAL

## 2025-04-07 VITALS
BODY MASS INDEX: 30.78 KG/M2 | OXYGEN SATURATION: 97 % | HEART RATE: 74 BPM | DIASTOLIC BLOOD PRESSURE: 78 MMHG | SYSTOLIC BLOOD PRESSURE: 116 MMHG | HEIGHT: 65.55 IN | TEMPERATURE: 98.5 F | WEIGHT: 187 LBS

## 2025-04-07 DIAGNOSIS — Z79.899 OTHER LONG TERM (CURRENT) DRUG THERAPY: ICD-10-CM

## 2025-04-07 DIAGNOSIS — R73.09 OTHER ABNORMAL GLUCOSE: ICD-10-CM

## 2025-04-07 DIAGNOSIS — I10 ESSENTIAL (PRIMARY) HYPERTENSION: ICD-10-CM

## 2025-04-07 DIAGNOSIS — R73.03 PREDIABETES.: ICD-10-CM

## 2025-04-07 DIAGNOSIS — Z11.1 ENCOUNTER FOR SCREENING FOR RESPIRATORY TUBERCULOSIS: ICD-10-CM

## 2025-04-07 DIAGNOSIS — G25.0 ESSENTIAL TREMOR: ICD-10-CM

## 2025-04-07 DIAGNOSIS — K21.9 GASTRO-ESOPHAGEAL REFLUX DISEASE W/OUT ESOPHAGITIS: ICD-10-CM

## 2025-04-07 DIAGNOSIS — E78.5 HYPERLIPIDEMIA, UNSPECIFIED: ICD-10-CM

## 2025-04-07 DIAGNOSIS — E66.3 OVERWEIGHT: ICD-10-CM

## 2025-04-07 PROCEDURE — 99214 OFFICE O/P EST MOD 30 MIN: CPT

## 2025-04-07 PROCEDURE — 36415 COLL VENOUS BLD VENIPUNCTURE: CPT

## 2025-04-07 PROCEDURE — G2211 COMPLEX E/M VISIT ADD ON: CPT | Mod: NC

## 2025-04-07 RX ORDER — OMEPRAZOLE 40 MG/1
40 CAPSULE, DELAYED RELEASE ORAL
Qty: 90 | Refills: 1 | Status: ACTIVE | COMMUNITY
Start: 2025-04-07 | End: 1900-01-01

## 2025-04-07 RX ORDER — PROPRANOLOL HYDROCHLORIDE 20 MG/1
20 TABLET ORAL 3 TIMES DAILY
Qty: 270 | Refills: 0 | Status: ACTIVE | COMMUNITY
Start: 2025-04-07 | End: 1900-01-01

## 2025-04-10 PROBLEM — G25.0 TREMOR, ESSENTIAL: Status: ACTIVE | Noted: 2025-04-07

## 2025-04-10 PROBLEM — Z11.1 SCREENING FOR TUBERCULOSIS: Status: ACTIVE | Noted: 2025-04-07

## 2025-04-13 LAB
M TB IFN-G BLD-IMP: ABNORMAL
QUANTIFERON TB PLUS MITOGEN MINUS NIL: 0.05 IU/ML
QUANTIFERON TB PLUS NIL: 0.02 IU/ML
QUANTIFERON TB PLUS TB1 MINUS NIL: 0 IU/ML
QUANTIFERON TB PLUS TB2 MINUS NIL: 0.01 IU/ML

## 2025-04-14 ENCOUNTER — TRANSCRIPTION ENCOUNTER (OUTPATIENT)
Age: 65
End: 2025-04-14

## 2025-04-17 ENCOUNTER — TRANSCRIPTION ENCOUNTER (OUTPATIENT)
Age: 65
End: 2025-04-17

## 2025-04-21 ENCOUNTER — APPOINTMENT (OUTPATIENT)
Dept: GASTROENTEROLOGY | Facility: CLINIC | Age: 65
End: 2025-04-21
Payer: COMMERCIAL

## 2025-04-21 VITALS
BODY MASS INDEX: 30.62 KG/M2 | DIASTOLIC BLOOD PRESSURE: 78 MMHG | OXYGEN SATURATION: 97 % | HEIGHT: 65.5 IN | HEART RATE: 74 BPM | WEIGHT: 186 LBS | SYSTOLIC BLOOD PRESSURE: 116 MMHG

## 2025-04-21 DIAGNOSIS — K21.9 GASTRO-ESOPHAGEAL REFLUX DISEASE W/OUT ESOPHAGITIS: ICD-10-CM

## 2025-04-21 DIAGNOSIS — Z12.11 ENCOUNTER FOR SCREENING FOR MALIGNANT NEOPLASM OF COLON: ICD-10-CM

## 2025-04-21 PROCEDURE — 99204 OFFICE O/P NEW MOD 45 MIN: CPT

## 2025-04-21 RX ORDER — SODIUM SULFATE, POTASSIUM SULFATE AND MAGNESIUM SULFATE 1.6; 3.13; 17.5 G/177ML; G/177ML; G/177ML
17.5-3.13-1.6 SOLUTION ORAL
Qty: 2 | Refills: 0 | Status: ACTIVE | COMMUNITY
Start: 2025-04-21 | End: 1900-01-01

## 2025-04-22 ENCOUNTER — APPOINTMENT (OUTPATIENT)
Dept: INTERNAL MEDICINE | Facility: CLINIC | Age: 65
End: 2025-04-22
Payer: COMMERCIAL

## 2025-04-22 VITALS
TEMPERATURE: 98.2 F | SYSTOLIC BLOOD PRESSURE: 120 MMHG | WEIGHT: 191 LBS | OXYGEN SATURATION: 97 % | HEIGHT: 65.5 IN | HEART RATE: 93 BPM | BODY MASS INDEX: 31.44 KG/M2 | DIASTOLIC BLOOD PRESSURE: 78 MMHG | RESPIRATION RATE: 16 BRPM

## 2025-04-22 DIAGNOSIS — I10 ESSENTIAL (PRIMARY) HYPERTENSION: ICD-10-CM

## 2025-04-22 DIAGNOSIS — Z23 ENCOUNTER FOR IMMUNIZATION: ICD-10-CM

## 2025-04-22 DIAGNOSIS — Z11.1 ENCOUNTER FOR SCREENING FOR RESPIRATORY TUBERCULOSIS: ICD-10-CM

## 2025-04-22 PROCEDURE — 99213 OFFICE O/P EST LOW 20 MIN: CPT | Mod: 25

## 2025-04-22 PROCEDURE — 86580 TB INTRADERMAL TEST: CPT

## 2025-05-01 ENCOUNTER — APPOINTMENT (OUTPATIENT)
Dept: GASTROENTEROLOGY | Facility: HOSPITAL | Age: 65
End: 2025-05-01

## 2025-05-01 ENCOUNTER — OUTPATIENT (OUTPATIENT)
Dept: OUTPATIENT SERVICES | Facility: HOSPITAL | Age: 65
LOS: 1 days | End: 2025-05-01
Payer: COMMERCIAL

## 2025-05-01 ENCOUNTER — RESULT REVIEW (OUTPATIENT)
Age: 65
End: 2025-05-01

## 2025-05-01 DIAGNOSIS — Z12.11 ENCOUNTER FOR SCREENING FOR MALIGNANT NEOPLASM OF COLON: ICD-10-CM

## 2025-05-01 DIAGNOSIS — K21.9 GASTRO-ESOPHAGEAL REFLUX DISEASE WITHOUT ESOPHAGITIS: ICD-10-CM

## 2025-05-01 LAB — GLUCOSE BLDC GLUCOMTR-MCNC: 121 MG/DL — HIGH (ref 70–99)

## 2025-05-01 PROCEDURE — 88305 TISSUE EXAM BY PATHOLOGIST: CPT | Mod: 26

## 2025-05-01 PROCEDURE — 45380 COLONOSCOPY AND BIOPSY: CPT

## 2025-05-01 PROCEDURE — 88305 TISSUE EXAM BY PATHOLOGIST: CPT

## 2025-05-01 PROCEDURE — 88313 SPECIAL STAINS GROUP 2: CPT | Mod: 26

## 2025-05-01 PROCEDURE — 88312 SPECIAL STAINS GROUP 1: CPT

## 2025-05-01 PROCEDURE — 88312 SPECIAL STAINS GROUP 1: CPT | Mod: 26

## 2025-05-01 PROCEDURE — 88313 SPECIAL STAINS GROUP 2: CPT

## 2025-05-01 PROCEDURE — 43239 EGD BIOPSY SINGLE/MULTIPLE: CPT

## 2025-05-01 PROCEDURE — 45380 COLONOSCOPY AND BIOPSY: CPT | Mod: XS,PT

## 2025-05-01 PROCEDURE — 45385 COLONOSCOPY W/LESION REMOVAL: CPT | Mod: PT

## 2025-05-01 PROCEDURE — 82962 GLUCOSE BLOOD TEST: CPT

## 2025-05-01 DEVICE — CLIP RESOLUTION 360 235CM: Type: IMPLANTABLE DEVICE | Status: FUNCTIONAL

## 2025-05-01 DEVICE — HEMOSPRAY HEMOSTAT ENDO 7F: Type: IMPLANTABLE DEVICE | Status: FUNCTIONAL

## 2025-05-01 DEVICE — ESOPHAGEAL BALLOON CATH CRE FIXED WIRE 6-7-8MM: Type: IMPLANTABLE DEVICE | Status: FUNCTIONAL

## 2025-05-02 LAB — SURGICAL PATHOLOGY STUDY: SIGNIFICANT CHANGE UP

## 2025-05-12 ENCOUNTER — APPOINTMENT (OUTPATIENT)
Dept: GASTROENTEROLOGY | Facility: CLINIC | Age: 65
End: 2025-05-12

## 2025-05-16 ENCOUNTER — APPOINTMENT (OUTPATIENT)
Dept: CARDIOLOGY | Facility: CLINIC | Age: 65
End: 2025-05-16
Payer: COMMERCIAL

## 2025-05-16 ENCOUNTER — NON-APPOINTMENT (OUTPATIENT)
Age: 65
End: 2025-05-16

## 2025-05-16 VITALS
DIASTOLIC BLOOD PRESSURE: 85 MMHG | OXYGEN SATURATION: 94 % | HEIGHT: 65.5 IN | BODY MASS INDEX: 30.78 KG/M2 | SYSTOLIC BLOOD PRESSURE: 132 MMHG | HEART RATE: 55 BPM | WEIGHT: 187 LBS

## 2025-05-16 DIAGNOSIS — R73.03 PREDIABETES.: ICD-10-CM

## 2025-05-16 DIAGNOSIS — E78.5 HYPERLIPIDEMIA, UNSPECIFIED: ICD-10-CM

## 2025-05-16 DIAGNOSIS — I10 ESSENTIAL (PRIMARY) HYPERTENSION: ICD-10-CM

## 2025-05-16 DIAGNOSIS — R73.09 OTHER ABNORMAL GLUCOSE: ICD-10-CM

## 2025-05-16 PROCEDURE — 93000 ELECTROCARDIOGRAM COMPLETE: CPT

## 2025-05-16 PROCEDURE — 99214 OFFICE O/P EST MOD 30 MIN: CPT

## 2025-05-16 PROCEDURE — G2211 COMPLEX E/M VISIT ADD ON: CPT | Mod: NC

## 2025-05-19 ENCOUNTER — OUTPATIENT (OUTPATIENT)
Dept: OUTPATIENT SERVICES | Facility: HOSPITAL | Age: 65
LOS: 1 days | End: 2025-05-19
Payer: COMMERCIAL

## 2025-05-19 ENCOUNTER — APPOINTMENT (OUTPATIENT)
Dept: CT IMAGING | Facility: CLINIC | Age: 65
End: 2025-05-19
Payer: COMMERCIAL

## 2025-05-19 DIAGNOSIS — R91.8 OTHER NONSPECIFIC ABNORMAL FINDING OF LUNG FIELD: ICD-10-CM

## 2025-05-19 DIAGNOSIS — Z98.890 OTHER SPECIFIED POSTPROCEDURAL STATES: Chronic | ICD-10-CM

## 2025-05-19 PROCEDURE — 71250 CT THORAX DX C-: CPT

## 2025-05-19 PROCEDURE — 71250 CT THORAX DX C-: CPT | Mod: 26

## 2025-05-27 ENCOUNTER — TRANSCRIPTION ENCOUNTER (OUTPATIENT)
Age: 65
End: 2025-05-27

## 2025-05-29 ENCOUNTER — TRANSCRIPTION ENCOUNTER (OUTPATIENT)
Age: 65
End: 2025-05-29

## 2025-05-31 ENCOUNTER — TRANSCRIPTION ENCOUNTER (OUTPATIENT)
Age: 65
End: 2025-05-31

## 2025-07-07 ENCOUNTER — RX RENEWAL (OUTPATIENT)
Age: 65
End: 2025-07-07

## 2025-07-07 RX ORDER — PROPRANOLOL HYDROCHLORIDE 20 MG/1
20 TABLET ORAL
Qty: 270 | Refills: 0 | Status: ACTIVE | COMMUNITY
Start: 2025-07-07 | End: 1900-01-01

## 2025-07-23 ENCOUNTER — APPOINTMENT (OUTPATIENT)
Dept: MAMMOGRAPHY | Facility: CLINIC | Age: 65
End: 2025-07-23
Payer: COMMERCIAL

## 2025-07-23 ENCOUNTER — OUTPATIENT (OUTPATIENT)
Dept: OUTPATIENT SERVICES | Facility: HOSPITAL | Age: 65
LOS: 1 days | End: 2025-07-23

## 2025-07-23 ENCOUNTER — APPOINTMENT (OUTPATIENT)
Dept: ULTRASOUND IMAGING | Facility: CLINIC | Age: 65
End: 2025-07-23
Payer: COMMERCIAL

## 2025-07-23 ENCOUNTER — OUTPATIENT (OUTPATIENT)
Dept: OUTPATIENT SERVICES | Facility: HOSPITAL | Age: 65
LOS: 1 days | End: 2025-07-23
Payer: COMMERCIAL

## 2025-07-23 DIAGNOSIS — Z98.890 OTHER SPECIFIED POSTPROCEDURAL STATES: Chronic | ICD-10-CM

## 2025-07-23 DIAGNOSIS — Z00.8 ENCOUNTER FOR OTHER GENERAL EXAMINATION: ICD-10-CM

## 2025-07-23 PROCEDURE — 77066 DX MAMMO INCL CAD BI: CPT

## 2025-07-23 PROCEDURE — 76641 ULTRASOUND BREAST COMPLETE: CPT | Mod: 26,50

## 2025-07-23 PROCEDURE — 77062 BREAST TOMOSYNTHESIS BI: CPT | Mod: 26

## 2025-07-23 PROCEDURE — 77066 DX MAMMO INCL CAD BI: CPT | Mod: 26

## 2025-07-23 PROCEDURE — 76641 ULTRASOUND BREAST COMPLETE: CPT

## 2025-07-23 PROCEDURE — G0279: CPT

## 2025-09-08 ENCOUNTER — APPOINTMENT (OUTPATIENT)
Dept: HEMATOLOGY ONCOLOGY | Facility: CLINIC | Age: 65
End: 2025-09-08
Payer: COMMERCIAL

## 2025-09-08 VITALS
RESPIRATION RATE: 16 BRPM | WEIGHT: 190.9 LBS | HEIGHT: 66.65 IN | OXYGEN SATURATION: 99 % | TEMPERATURE: 97.2 F | BODY MASS INDEX: 30.32 KG/M2 | DIASTOLIC BLOOD PRESSURE: 85 MMHG | HEART RATE: 62 BPM | SYSTOLIC BLOOD PRESSURE: 134 MMHG

## 2025-09-08 DIAGNOSIS — C50.912 MALIGNANT NEOPLASM OF UNSPECIFIED SITE OF RIGHT FEMALE BREAST: ICD-10-CM

## 2025-09-08 DIAGNOSIS — C50.911 MALIGNANT NEOPLASM OF UNSPECIFIED SITE OF RIGHT FEMALE BREAST: ICD-10-CM

## 2025-09-08 PROCEDURE — 99214 OFFICE O/P EST MOD 30 MIN: CPT

## (undated) DEVICE — SENSOR O2 FINGER XL ADULT 24/BX 6BX/CA

## (undated) DEVICE — DRAPE MAYO STAND 23"

## (undated) DEVICE — MYOSURE SCOPE SEAL

## (undated) DEVICE — TUBING IV SET GRAVITY 3Y 100" MACRO

## (undated) DEVICE — PACK IV START WITH CHG

## (undated) DEVICE — SET IV PUMP BLOOD 1VALVE 180FILTER NON-DEHP

## (undated) DEVICE — FORCEP RADIAL JAW 4 W NDL 2.2MM 2.8MM 160CM YELLOW DISP

## (undated) DEVICE — PACK LITHOTOMY

## (undated) DEVICE — CLAMP BX HOT RAD JAW 3

## (undated) DEVICE — SNARE CAPTIVATOR II RND COLD 10MM

## (undated) DEVICE — TUBING SUCTION CONN 6FT STERILE

## (undated) DEVICE — TUBING TUR 2 PRONG

## (undated) DEVICE — MARKER ENDO SPOT EX

## (undated) DEVICE — SOL INJ LR 1000ML

## (undated) DEVICE — SYR LUER LOK 30CC

## (undated) DEVICE — FORCEP RADIAL JAW 4 W NDL 2.4MM 2.8MM 240CM ORANGE DISP

## (undated) DEVICE — VALVE BIOPSY

## (undated) DEVICE — RADIAL JAW 4 LG CAPACITY WITH NDL

## (undated) DEVICE — SYR LUER SLIP TIP 50CC

## (undated) DEVICE — SUCTION YANKAUER TAPERED BULBOUS NO VENT

## (undated) DEVICE — WARMING BLANKET UPPER ADULT

## (undated) DEVICE — BRUSH CYTO ENDO

## (undated) DEVICE — BITE BLOCK MAXI RUBBER STAMP

## (undated) DEVICE — TUBE O2 SUPL CRUSH RESIS CONN SOUTHSIDE ONLY

## (undated) DEVICE — BRUSH COLONOSCOPY CYTOLOGY

## (undated) DEVICE — SYR LUER SLIP TIP 30CC

## (undated) DEVICE — CATH ELCTR GLIDE PRB 7FR

## (undated) DEVICE — STERIS DEFENDO 3-PIECE KIT (AIR/WATER, SUCTION & BIOPSY VALVES)

## (undated) DEVICE — PREP TRAY DRY SKIN PREP SCRUB

## (undated) DEVICE — GLV 7 PROTEXIS (WHITE)

## (undated) DEVICE — CANISTER SUCTION 1200CC 10/SL

## (undated) DEVICE — SOL IRR POUR H2O 500ML

## (undated) DEVICE — FLUENT FMS PROCEDURE KIT

## (undated) DEVICE — RETRIEVER ROTH NET PLATINUM-UNIVERSAL

## (undated) DEVICE — DRAPE LIGHT HANDLE COVER (GREEN)

## (undated) DEVICE — TUBING SUCTION 20FT

## (undated) DEVICE — ENDOCUFF VISION SZ 2 LG GRN

## (undated) DEVICE — VENODYNE/SCD SLEEVE CALF LARGE

## (undated) DEVICE — MASK O2 NON REBREATH 3IN1 ADULT

## (undated) DEVICE — SNARE LRG

## (undated) DEVICE — TUBING CANNULA SALTER LABS NASAL ADULT 7FT

## (undated) DEVICE — SNARE POLYP SENS 27MM 240CM

## (undated) DEVICE — DRAPE TOWEL BLUE 17" X 24"

## (undated) DEVICE — ELCTR GROUNDING PAD ADULT COVIDIEN

## (undated) DEVICE — Device

## (undated) DEVICE — SYR IV POSIFLUSH NS 3ML 30/TY

## (undated) DEVICE — CATH ELECHMSTAT  INJ 7FR 210CM

## (undated) DEVICE — ENDOCUFF VISION SZ 3 SM PRPL

## (undated) DEVICE — TUBING IV SET SECONDARY 34"

## (undated) DEVICE — POLY TRAP ETRAP

## (undated) DEVICE — TUBING IV SET SECOND 34" W/O LOK-BLUNT

## (undated) DEVICE — SYR ALLIANCE II INFLATION 60ML

## (undated) DEVICE — SOL IRR NS 0.9% 250ML

## (undated) DEVICE — TRAP SPECIMEN SPUTUM 40CC

## (undated) DEVICE — PLV-SCD MACHINE: Type: DURABLE MEDICAL EQUIPMENT

## (undated) DEVICE — SUT HEWSON RETRIEVER

## (undated) DEVICE — FORCEP RADIAL JAW 4 JUMBO 2.8MM 3.2MM 240CM ORANGE DISP

## (undated) DEVICE — FORMALIN CUPS 10% BUFFERED

## (undated) DEVICE — NDL INJ SCLERO INTERJECT 23G

## (undated) DEVICE — CATH IV SAFE BC 22G X 1" (BLUE)

## (undated) DEVICE — DRSG CURITY GAUZE SPONGE 4 X 4" 12-PLY

## (undated) DEVICE — VENODYNE/SCD SLEEVE CALF MEDIUM

## (undated) DEVICE — TRAP QUICK CATCH  SINGL CHAMBER

## (undated) DEVICE — MASK O2 ADLT POM ELITE W/ MED AND HI CONCEN M TO M 10FT

## (undated) DEVICE — CATH IV SAFE BC 20G X 1.16" (PINK)